# Patient Record
Sex: FEMALE | Race: WHITE | Employment: PART TIME | URBAN - METROPOLITAN AREA
[De-identification: names, ages, dates, MRNs, and addresses within clinical notes are randomized per-mention and may not be internally consistent; named-entity substitution may affect disease eponyms.]

---

## 2021-04-06 LAB
EXTERNAL HIV CONFIRMATION: NORMAL
EXTERNAL HIV SCREEN: NORMAL
HCV AB SER-ACNC: NONREACTIVE

## 2024-07-15 ENCOUNTER — APPOINTMENT (EMERGENCY)
Dept: NON INVASIVE DIAGNOSTICS | Facility: HOSPITAL | Age: 39
End: 2024-07-15
Payer: COMMERCIAL

## 2024-07-15 ENCOUNTER — OFFICE VISIT (OUTPATIENT)
Age: 39
End: 2024-07-15
Payer: COMMERCIAL

## 2024-07-15 ENCOUNTER — HOSPITAL ENCOUNTER (EMERGENCY)
Facility: HOSPITAL | Age: 39
Discharge: HOME | End: 2024-07-15
Attending: EMERGENCY MEDICINE
Payer: COMMERCIAL

## 2024-07-15 VITALS
SYSTOLIC BLOOD PRESSURE: 162 MMHG | OXYGEN SATURATION: 99 % | RESPIRATION RATE: 17 BRPM | DIASTOLIC BLOOD PRESSURE: 82 MMHG | TEMPERATURE: 98.2 F | BODY MASS INDEX: 31.24 KG/M2 | WEIGHT: 206.13 LBS | HEIGHT: 68 IN | HEART RATE: 83 BPM

## 2024-07-15 VITALS
SYSTOLIC BLOOD PRESSURE: 139 MMHG | DIASTOLIC BLOOD PRESSURE: 66 MMHG | HEART RATE: 68 BPM | TEMPERATURE: 98.5 F | RESPIRATION RATE: 18 BRPM | OXYGEN SATURATION: 96 %

## 2024-07-15 DIAGNOSIS — M79.662 PAIN OF LEFT CALF: Primary | ICD-10-CM

## 2024-07-15 DIAGNOSIS — S86.812A STRAIN OF LEFT CALF MUSCLE: Primary | ICD-10-CM

## 2024-07-15 LAB
EXT PREGNANCY TEST URINE: NEGATIVE
EXT. CONTROL: NORMAL

## 2024-07-15 PROCEDURE — 93971 EXTREMITY STUDY: CPT

## 2024-07-15 PROCEDURE — 81025 URINE PREGNANCY TEST: CPT

## 2024-07-15 PROCEDURE — 99284 EMERGENCY DEPT VISIT MOD MDM: CPT

## 2024-07-15 PROCEDURE — 99203 OFFICE O/P NEW LOW 30 MIN: CPT | Performed by: EMERGENCY MEDICINE

## 2024-07-15 PROCEDURE — 96372 THER/PROPH/DIAG INJ SC/IM: CPT

## 2024-07-15 RX ORDER — ACETAMINOPHEN 325 MG/1
975 TABLET ORAL ONCE
Status: COMPLETED | OUTPATIENT
Start: 2024-07-15 | End: 2024-07-15

## 2024-07-15 RX ORDER — KETOROLAC TROMETHAMINE 30 MG/ML
15 INJECTION, SOLUTION INTRAMUSCULAR; INTRAVENOUS ONCE
Status: COMPLETED | OUTPATIENT
Start: 2024-07-15 | End: 2024-07-15

## 2024-07-15 RX ORDER — CYCLOBENZAPRINE HCL 10 MG
10 TABLET ORAL 2 TIMES DAILY PRN
Qty: 20 TABLET | Refills: 0 | Status: SHIPPED | OUTPATIENT
Start: 2024-07-15

## 2024-07-15 RX ORDER — LIDOCAINE 50 MG/G
1 PATCH TOPICAL ONCE
Status: DISCONTINUED | OUTPATIENT
Start: 2024-07-15 | End: 2024-07-15 | Stop reason: HOSPADM

## 2024-07-15 RX ADMIN — KETOROLAC TROMETHAMINE 15 MG: 30 INJECTION, SOLUTION INTRAMUSCULAR; INTRAVENOUS at 19:18

## 2024-07-15 RX ADMIN — LIDOCAINE 1 PATCH: 50 PATCH CUTANEOUS at 19:18

## 2024-07-15 RX ADMIN — ACETAMINOPHEN 975 MG: 325 TABLET, FILM COATED ORAL at 17:01

## 2024-07-15 NOTE — PROGRESS NOTES
"[facility]        NAME: Maggie Bullock is a 38 y.o. female  : 1985    MRN: [id number]  DATE: July 15, 2024  TIME: 3:38 PM    Assessment and Plan   Pain of left calf [M79.662]  1. Pain of left calf  Transfer to other facility    Ambulatory Referral to Physical Therapy            Patient Instructions     Patient Instructions   Patient Education     Deep vein thrombosis - Discharge instructions   The Basics   Written by the doctors and editors at [facility]   What are discharge instructions? -- Discharge instructions are information about how to take care of yourself after getting medical care for a health problem.  What is deep vein thrombosis? -- Deep vein thrombosis (\"DVT\") is the medical term for a blood clot in the deep veins. Most DVTs happen in the legs (figure 1), but a DVT can also happen in the arms. When a DVT happens, blood can back up and cause swelling and pain.  Why are blood clots dangerous? -- If a blood clot forms or gets stuck inside a blood vessel, it can clog the vessel and keep blood from getting where it needs to go.  Blood clots can also travel to other parts of the body and clog blood vessels there. For example, a clot that forms in the leg could end up blocking a blood vessel in the lung. This can make it hard to breathe. Sometimes, if the clot is large, it can even lead to death.  How do I care for myself at home? -- Ask the doctor or nurse what you should do when you go home. Make sure that you understand exactly what you need to do to care for yourself. Ask questions if there is anything you do not understand.  To help with pain and swelling:   If the blood clot is in your leg, prop your legs on a pillow when in bed and on a chair or footstool when you sit. Do not put the pillow under your knee sideways so that your knee is bent. Instead, put it the long way to support your knee and lower leg (figure 2).   If the blood clot is in your arm, prop your arm on a pillow when in bed or " "sitting. Try to keep your arm above the level of your heart.  You should also:   Take all of your medicines exactly as your doctor tells you to. Blood clots are treated with medicines called \"anticoagulants.\" These are sometimes called \"blood thinners.\"   Follow your doctor's instructions about diet and your medicines. Depending on which medicines you take, you might need to pay special attention to what you eat.   Make sure that you know what other medicines are safe to take. Certain other medicines can affect the way that anticoagulants work.   Make sure that you take your medicine exactly as instructed. It's very important to take the right dose. Too much can cause bleeding, and too little can allow your blood to clot.   Try to take your medicine at the same time each day (or at the same times, if you take it twice a day). If you forget or miss a dose, call your doctor or pharmacist to find out what to do.   When you start taking the medicine, you might need to have your blood tested.   If you take warfarin (brand name: Jantoven), you will need regular blood tests to check how your blood is clotting. This is important to make sure that you get the correct dose of warfarin for you.   Get your medicines refilled before you run out.   Avoid things that could increase your risk of injury or bleeding. For example, avoid sports where you could get hurt, and be very careful if you need to use sharp tools.   Lower your risk of getting another blood clot. Your risk is higher if you miss a dose of medicine or stop taking your medicine. Other ways to lower your risk include:   Try to be active - Walk, garden, or do something active for 30 minutes or more on most days of the week.   If you spend a lot of time sitting, try to stand up and walk around at least every 1 to 2 hours. Avoid sitting with your legs crossed. If you can't get up, bend and straighten your knees and ankles and wiggle your toes. Also move your hands, " "wrists, and arms.   When driving or traveling in a car, try to stop every 1 to 2 hours to get out and stretch your legs.   If you travel by plane, move your arms and legs regularly. Walk around and stretch your legs at least once every hour.   Wear elastic or compression stockings. This can improve blood flow in your legs.   If you smoke, it's very important to try to quit. Your doctor or nurse can help you if you are having trouble quitting.  In some cases, people get something called an \"inferior vena cava filter\" (\"IVC filter\") or \"superior vena cava filter\" (\"SVC filter\"). If you had surgery to get an IVC or SVC filter, your doctor or nurse will tell you how to take care of your incision. The filter might need to be removed in the future.  What follow-up care do I need? -- Your doctor or nurse will tell you if you need to make a follow-up appointment. If so, make sure that you know when and where to go. Have your blood tested when your doctor tells you to.  When should I call the doctor? -- Call for emergency help right away (in the [place] and [place], call 9-1-1) if:   You feel short of breath or have trouble breathing.   You have sharp or severe chest pain when you breathe.   You are coughing up blood.   You have signs of stroke, like sudden:   Numbness or weakness of the face, arm, or leg, especially on 1 side of the body   Confusion, or trouble speaking or understanding   Trouble seeing in 1 or both eyes   Trouble walking, dizziness, or loss of balance or coordination   Severe headache with no known cause  Call the doctor or nurse for advice if:   You notice new or worsened swelling in your arm or leg.   Your arm or leg becomes numb or very painful to touch.   Your leg hurts when you walk, or your arm hurts when you move it.   Your arm or leg turns blue or gray.   You have discomfort when you take a deep breath.   You have any of these signs of abnormal bleeding:   Nausea   Blood in your bowel movements or " dark-colored bowel movements   Headaches or dizziness   Nosebleeds or any other bleeding that does not stop   Dark red or brown urine  You should also tell your doctor if you:   Have an injury such as a fall where you hit your head   Bleed from your gums after brushing your teeth   Have heavy menstrual periods or bleeding between periods   Have more bruising than usual after a minor injury   Have diarrhea, vomit, or are unable to eat for more than 24 hours   Have a fever (temperature higher than 100.4°F or 38°C)   Cannot take your medicine for any reason  All topics are updated as new evidence becomes available and our peer review process is complete.  This topic retrieved from [employer] on: Feb 26, 2024.  Topic 296809 Version 2.0  Release: 32.2.4 - C32.56  © 2024 UpToDate, Inc. and/or its affiliates. All rights reserved.  figure 1: Deep veins of the leg     In people with deep vein thrombosis, blood clots can form in the deep veins of the legs (shown in blue).  Graphic 21040 Version 2.0  figure 2: Using a pillow to prop up your legs     Use a pillow to prop up your lower legs in bed. Put it the long way to support your knees and lower legs.  Graphic 420407 Version 1.0  Consumer Information Use and Disclaimer   Disclaimer: This generalized information is a limited summary of diagnosis, treatment, and/or medication information. It is not meant to be comprehensive and should be used as a tool to help the user understand and/or assess potential diagnostic and treatment options. It does NOT include all information about conditions, treatments, medications, side effects, or risks that may apply to a specific patient. It is not intended to be medical advice or a substitute for the medical advice, diagnosis, or treatment of a health care provider based on the health care provider's examination and assessment of a patient's specific and unique circumstances. Patients must speak with a health care provider for complete  information about their health, medical questions, and treatment options, including any risks or benefits regarding use of medications. This information does not endorse any treatments or medications as safe, effective, or approved for treating a specific patient. UpToDate, Inc. and its affiliates disclaim any warranty or liability relating to this information or the use thereof.The use of this information is governed by the Terms of Use, available at https://www.official.fmer.com/en/know/clinical-effectiveness-terms. 2024© UpToDate, Inc. and its affiliates and/or licensors. All rights reserved.  Copyright   © 2024 UpToDate, Inc. and/or its affiliates. All rights reserved.      Follow up with PCP in 3-5 days.  Proceed to  ER if symptoms worsen.    Chief Complaint     Chief Complaint   Patient presents with    Leg Pain     X2 weeks - persistent pain in LEFT lower leg below knee at the top of calf muscle. Denies specific injury. States that she has been working out a lot more. OTC - IBU Concerned that back of knee is swollen compared to other leg. Denies redness in back of calf. States that one leg feels warmer than the other.          History of Present Illness       Patient complains of left calf pain for the past 2 weeks.  She noticed that today her left calf appeared to be swollen.  She denies chest pain or shortness of breath.  She denies history of PE or DVT.  Is an ex-smoker, not on estrogens and no family history of DVT or PE.  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            Review of Systems   Review of Systems   Constitutional:  Negative for activity change.   Cardiovascular:  Positive for leg swelling. Negative for chest pain and palpitations.   Gastrointestinal:  Negative for abdominal pain.   Musculoskeletal:  Negative for arthralgias, back pain, joint swelling, myalgias, neck pain and neck stiffness.   Skin:  Negative for color change and wound.   Neurological:  Negative for dizziness,  "syncope, weakness, light-headedness and headaches.         Current Medications     No current outpatient medications on file.    Current Allergies     Allergies as of 07/15/2024 - Reviewed 07/15/2024   Allergen Reaction Noted    Penicillins Hives 04/27/2021            The following portions of the patient's history were reviewed and updated as appropriate: allergies, current medications, past family history, past medical history, past social history, past surgical history and problem list.     History reviewed. No pertinent past medical history.    Past Surgical History:   Procedure Laterality Date    CERVICAL BIOPSY  W/ LOOP ELECTRODE EXCISION      MOUTH SURGERY         History reviewed. No pertinent family history.      Medications have been verified.        Objective   /82 (BP Location: Right arm, Patient Position: Sitting, Cuff Size: Standard)   Pulse 83   Temp 98.2 °F (36.8 °C) (Tympanic)   Resp 17   Ht 5' 7.5\" (1.715 m)   Wt 93.5 kg (206 lb 2.1 oz)   LMP 06/26/2024 (Approximate)   SpO2 99%   BMI 31.81 kg/m²        Physical Exam     Physical Exam  Vitals and nursing note reviewed.   Constitutional:       General: She is not in acute distress.     Appearance: She is well-developed. She is not ill-appearing.   HENT:      Head: Normocephalic and atraumatic.   Eyes:      Conjunctiva/sclera: Conjunctivae normal.      Pupils: Pupils are equal, round, and reactive to light.   Musculoskeletal:         General: Swelling and tenderness present.      Cervical back: Normal range of motion and neck supple.      Comments: Mild swelling, mild tenderness left calf, Homans negative.   Skin:     General: Skin is warm and dry.      Findings: No rash.   Neurological:      Mental Status: She is alert and oriented to person, place, and time.      Deep Tendon Reflexes: Reflexes normal.   Psychiatric:         Mood and Affect: Mood normal.         Behavior: Behavior normal.         Thought Content: Thought content normal. "         Judgment: Judgment normal.

## 2024-07-15 NOTE — PATIENT INSTRUCTIONS
"Patient Education     Deep vein thrombosis - Discharge instructions   The Basics   Written by the doctors and editors at [facility]   What are discharge instructions? -- Discharge instructions are information about how to take care of yourself after getting medical care for a health problem.  What is deep vein thrombosis? -- Deep vein thrombosis (\"DVT\") is the medical term for a blood clot in the deep veins. Most DVTs happen in the legs (figure 1), but a DVT can also happen in the arms. When a DVT happens, blood can back up and cause swelling and pain.  Why are blood clots dangerous? -- If a blood clot forms or gets stuck inside a blood vessel, it can clog the vessel and keep blood from getting where it needs to go.  Blood clots can also travel to other parts of the body and clog blood vessels there. For example, a clot that forms in the leg could end up blocking a blood vessel in the lung. This can make it hard to breathe. Sometimes, if the clot is large, it can even lead to death.  How do I care for myself at home? -- Ask the doctor or nurse what you should do when you go home. Make sure that you understand exactly what you need to do to care for yourself. Ask questions if there is anything you do not understand.  To help with pain and swelling:   If the blood clot is in your leg, prop your legs on a pillow when in bed and on a chair or footstool when you sit. Do not put the pillow under your knee sideways so that your knee is bent. Instead, put it the long way to support your knee and lower leg (figure 2).   If the blood clot is in your arm, prop your arm on a pillow when in bed or sitting. Try to keep your arm above the level of your heart.  You should also:   Take all of your medicines exactly as your doctor tells you to. Blood clots are treated with medicines called \"anticoagulants.\" These are sometimes called \"blood thinners.\"   Follow your doctor's instructions about diet and your medicines. Depending on which " medicines you take, you might need to pay special attention to what you eat.   Make sure that you know what other medicines are safe to take. Certain other medicines can affect the way that anticoagulants work.   Make sure that you take your medicine exactly as instructed. It's very important to take the right dose. Too much can cause bleeding, and too little can allow your blood to clot.   Try to take your medicine at the same time each day (or at the same times, if you take it twice a day). If you forget or miss a dose, call your doctor or pharmacist to find out what to do.   When you start taking the medicine, you might need to have your blood tested.   If you take warfarin (brand name: Jantoven), you will need regular blood tests to check how your blood is clotting. This is important to make sure that you get the correct dose of warfarin for you.   Get your medicines refilled before you run out.   Avoid things that could increase your risk of injury or bleeding. For example, avoid sports where you could get hurt, and be very careful if you need to use sharp tools.   Lower your risk of getting another blood clot. Your risk is higher if you miss a dose of medicine or stop taking your medicine. Other ways to lower your risk include:   Try to be active - Walk, garden, or do something active for 30 minutes or more on most days of the week.   If you spend a lot of time sitting, try to stand up and walk around at least every 1 to 2 hours. Avoid sitting with your legs crossed. If you can't get up, bend and straighten your knees and ankles and wiggle your toes. Also move your hands, wrists, and arms.   When driving or traveling in a car, try to stop every 1 to 2 hours to get out and stretch your legs.   If you travel by plane, move your arms and legs regularly. Walk around and stretch your legs at least once every hour.   Wear elastic or compression stockings. This can improve blood flow in your legs.   If you smoke,  "it's very important to try to quit. Your doctor or nurse can help you if you are having trouble quitting.  In some cases, people get something called an \"inferior vena cava filter\" (\"IVC filter\") or \"superior vena cava filter\" (\"SVC filter\"). If you had surgery to get an IVC or SVC filter, your doctor or nurse will tell you how to take care of your incision. The filter might need to be removed in the future.  What follow-up care do I need? -- Your doctor or nurse will tell you if you need to make a follow-up appointment. If so, make sure that you know when and where to go. Have your blood tested when your doctor tells you to.  When should I call the doctor? -- Call for emergency help right away (in the [place] and [place], call 9-1-1) if:   You feel short of breath or have trouble breathing.   You have sharp or severe chest pain when you breathe.   You are coughing up blood.   You have signs of stroke, like sudden:   Numbness or weakness of the face, arm, or leg, especially on 1 side of the body   Confusion, or trouble speaking or understanding   Trouble seeing in 1 or both eyes   Trouble walking, dizziness, or loss of balance or coordination   Severe headache with no known cause  Call the doctor or nurse for advice if:   You notice new or worsened swelling in your arm or leg.   Your arm or leg becomes numb or very painful to touch.   Your leg hurts when you walk, or your arm hurts when you move it.   Your arm or leg turns blue or gray.   You have discomfort when you take a deep breath.   You have any of these signs of abnormal bleeding:   Nausea   Blood in your bowel movements or dark-colored bowel movements   Headaches or dizziness   Nosebleeds or any other bleeding that does not stop   Dark red or brown urine  You should also tell your doctor if you:   Have an injury such as a fall where you hit your head   Bleed from your gums after brushing your teeth   Have heavy menstrual periods or bleeding between periods   Have " more bruising than usual after a minor injury   Have diarrhea, vomit, or are unable to eat for more than 24 hours   Have a fever (temperature higher than 100.4°F or 38°C)   Cannot take your medicine for any reason  All topics are updated as new evidence becomes available and our peer review process is complete.  This topic retrieved from [employer] on: Feb 26, 2024.  Topic 625263 Version 2.0  Release: 32.2.4 - C32.56  © 2024 UpToDate, Inc. and/or its affiliates. All rights reserved.  figure 1: Deep veins of the leg     In people with deep vein thrombosis, blood clots can form in the deep veins of the legs (shown in blue).  Graphic 32453 Version 2.0  figure 2: Using a pillow to prop up your legs     Use a pillow to prop up your lower legs in bed. Put it the long way to support your knees and lower legs.  Graphic 900319 Version 1.0  Consumer Information Use and Disclaimer   Disclaimer: This generalized information is a limited summary of diagnosis, treatment, and/or medication information. It is not meant to be comprehensive and should be used as a tool to help the user understand and/or assess potential diagnostic and treatment options. It does NOT include all information about conditions, treatments, medications, side effects, or risks that may apply to a specific patient. It is not intended to be medical advice or a substitute for the medical advice, diagnosis, or treatment of a health care provider based on the health care provider's examination and assessment of a patient's specific and unique circumstances. Patients must speak with a health care provider for complete information about their health, medical questions, and treatment options, including any risks or benefits regarding use of medications. This information does not endorse any treatments or medications as safe, effective, or approved for treating a specific patient. UpToDate, Inc. and its affiliates disclaim any warranty or liability relating to this  information or the use thereof.The use of this information is governed by the Terms of Use, available at https://www.wolterskluwer.com/en/know/clinical-effectiveness-terms. 2024© UpToDate, Inc. and its affiliates and/or licensors. All rights reserved.  Copyright   © 2024 [employer] and/or its affiliates. All rights reserved.

## 2024-07-15 NOTE — ED PROVIDER NOTES
History  Chief Complaint   Patient presents with    Leg Pain     Pt states that she has been experiencing a stabbing pain in her L calf since the beginning of July. Was sent from PCP for concerns of DVT. Also c/o increased swelling and pain when walking.      This is a pleasant 38-year-old female who presents to the ED for evaluation of left leg pain X approximately 2 weeks.  Patient denies any falls or injury.  She does report left calf pain that radiates to the back of her left knee.  She describes the pain as an aching sensation, states the pain is constant, slightly improved with exercise.  Does report that she goes to the gym multiple times per week.  She was seen at urgent care earlier today, sent to the ED for further evaluation and management due to concern for possible DVT.  She does state that she has not noticed any discoloration or swelling of her left leg, denies any weakness or loss of sensation of her left leg, states has been able to walk without significant difficulty.  She denies any other acute concerns or complaints at this time including recent fevers, chills, headaches, lightheadedness, hemoptysis, chest pain, SOB, abdominal pain, NVD, dysuria.  She denies any recent prolonged travel, denies any known history of blood clots, states she is not on any estrogen-based birth control.  She reports she did take Advil at approximately 1 PM.        None       History reviewed. No pertinent past medical history.    Past Surgical History:   Procedure Laterality Date    CERVICAL BIOPSY  W/ LOOP ELECTRODE EXCISION      MOUTH SURGERY         History reviewed. No pertinent family history.  I have reviewed and agree with the history as documented.    E-Cigarette/Vaping    E-Cigarette Use Former User      E-Cigarette/Vaping Substances    Nicotine No     THC No     CBD No     Flavoring No      Social History     Tobacco Use    Smoking status: Former     Types: Cigarettes     Passive exposure: Never    Smokeless  tobacco: Never   Vaping Use    Vaping status: Former   Substance Use Topics    Alcohol use: Yes     Comment: socially    Drug use: Yes     Types: Marijuana       Review of Systems   Constitutional:  Negative for chills and fever.   Eyes:  Negative for photophobia and visual disturbance.   Respiratory:  Negative for cough and shortness of breath.    Cardiovascular:  Negative for chest pain and palpitations.   Gastrointestinal:  Negative for abdominal pain, diarrhea, nausea and vomiting.   Genitourinary:  Negative for difficulty urinating, dysuria and hematuria.   Musculoskeletal:  Positive for myalgias (pain of left calf). Negative for neck pain and neck stiffness.   Neurological:  Negative for dizziness, light-headedness and headaches.       Physical Exam  Physical Exam  Vitals and nursing note reviewed.   Constitutional:       General: She is not in acute distress.     Appearance: Normal appearance. She is well-developed. She is not ill-appearing, toxic-appearing or diaphoretic.   HENT:      Head: Normocephalic and atraumatic.   Eyes:      Conjunctiva/sclera: Conjunctivae normal.   Cardiovascular:      Rate and Rhythm: Normal rate and regular rhythm.      Heart sounds: No murmur heard.  Pulmonary:      Effort: Pulmonary effort is normal. No respiratory distress.      Breath sounds: Normal breath sounds.   Abdominal:      Palpations: Abdomen is soft.      Tenderness: There is no abdominal tenderness.   Musculoskeletal:         General: No swelling.      Cervical back: Normal range of motion and neck supple. No rigidity.      Left hip: No deformity or tenderness. Normal range of motion.      Left upper leg: No swelling, deformity or tenderness.      Left knee: No swelling or deformity. Normal range of motion. No tenderness.      Left lower leg: No swelling or tenderness. No edema.      Left ankle: No swelling or deformity. No tenderness. Normal range of motion.      Left Achilles Tendon: No tenderness or defects.       Left foot: Normal range of motion and normal capillary refill. No swelling or deformity.      Comments: Examination of patient's left leg shows no significant swelling in the left leg when compared to right.  No signs of erythema or ecchymosis.  Left lower leg is warm but not hot to touch.  Patient is normal range of motion left hip, knee, ankle.  Able to plantar and dorsiflex without difficulty.  Calf is nontender.  Proximal and distal sensation intact, normal cap refill in all digits of left lower extremity, dorsal pedal pulse and posterior tibial pulses 2+.   Skin:     General: Skin is warm and dry.      Capillary Refill: Capillary refill takes less than 2 seconds.   Neurological:      General: No focal deficit present.      Mental Status: She is alert and oriented to person, place, and time.   Psychiatric:         Mood and Affect: Mood normal.         Vital Signs  ED Triage Vitals   Temperature Pulse Respirations Blood Pressure SpO2   07/15/24 1635 07/15/24 1632 07/15/24 1632 07/15/24 1632 07/15/24 1632   98.5 °F (36.9 °C) 104 16 (!) 197/101 98 %      Temp Source Heart Rate Source Patient Position - Orthostatic VS BP Location FiO2 (%)   07/15/24 1635 07/15/24 1632 07/15/24 1632 07/15/24 1632 --   Oral Monitor Sitting Right arm       Pain Score       07/15/24 1632       5           Vitals:    07/15/24 1730 07/15/24 1800 07/15/24 1830 07/15/24 1900   BP: 149/77 139/64 154/74 139/66   Pulse: 79 70 66 68   Patient Position - Orthostatic VS: Sitting Sitting Sitting Sitting         Visual Acuity      ED Medications  Medications   lidocaine (LIDODERM) 5 % patch 1 patch (1 patch Topical Medication Applied 7/15/24 1918)   acetaminophen (TYLENOL) tablet 975 mg (975 mg Oral Given 7/15/24 1701)   ketorolac (TORADOL) injection 15 mg (15 mg Intramuscular Given 7/15/24 1918)       Diagnostic Studies  Results Reviewed       Procedure Component Value Units Date/Time    POCT pregnancy, urine [[id number]]  (Normal) Resulted:  07/15/24 1754    Lab Status: Final result Updated: 07/15/24 1754     EXT Preg Test, Ur Negative     Control Valid                   VAS VENOUS DUPLEX -LOWER LIMB UNILATERAL    (Results Pending)              Procedures  Procedures         ED Course  ED Course as of 07/15/24 1921   Mon Jul 15, 2024   1909 VAS VENOUS DUPLEX -LOWER LIMB UNILATERAL  Per vascular tech, patient negative for DVT.                                 SBIRT 20yo+      Flowsheet Row Most Recent Value   Initial Alcohol Screen: US AUDIT-C     1. How often do you have a drink containing alcohol? 0 Filed at: 07/15/2024 1708   2. How many drinks containing alcohol do you have on a typical day you are drinking?  0 Filed at: 07/15/2024 1708   3a. Male UNDER 65: How often do you have five or more drinks on one occasion? 0 Filed at: 07/15/2024 1708   3b. FEMALE Any Age, or MALE 65+: How often do you have 4 or more drinks on one occassion? 0 Filed at: 07/15/2024 1708   Audit-C Score 0 Filed at: 07/15/2024 1708   RAFAELA: How many times in the past year have you...    Used an illegal drug or used a prescription medication for non-medical reasons? Never Filed at: 07/15/2024 1708                      Medical Decision Making  3-year-old female presents to the ED for evaluation of nontraumatic left calf pain X approximately 2 weeks.  Denies any other acute concerns or complaints at this time, ambulatory in ED.  Afebrile normal vital signs in ED, well-appearing on exam.  Venous duplex ultrasound showed no signs of acute DVT.  Symptoms likely musculoskeletal in nature, patient was already given referral to physical therapy by urgent care.  Given as needed Flexeril, advised not to drive or operate heavy machinery after use as it may cause drowsiness.  Advised importance of follow-up with physical therapy, also advised PCP follow-up for further evaluation and management.  ED return precautions discussed with patient.  Patient verbalized understanding and agreement with  plan.    Amount and/or Complexity of Data Reviewed  Labs: ordered.    Risk  OTC drugs.                 Disposition  Final diagnoses:   Strain of left calf muscle     Time reflects when diagnosis was documented in both MDM as applicable and the Disposition within this note       Time User Action Codes Description Comment    7/15/2024  7:14 PM Alex Rosenthal Add [S86.812A] Strain of left calf muscle           ED Disposition       ED Disposition   Discharge    Condition   Stable    Date/Time   Mon Jul 15, 2024 1914    Comment   Maggie Bullock discharge to home/self care.                   Follow-up Information    None         Patient's Medications   Discharge Prescriptions    CYCLOBENZAPRINE (FLEXERIL) 10 MG TABLET    Take 1 tablet (10 mg total) by mouth 2 (two) times a day as needed for muscle spasms       Start Date: 7/15/2024 End Date: --       Order Dose: 10 mg       Quantity: 20 tablet    Refills: 0       No discharge procedures on file.    PDMP Review       None            ED Provider  Electronically Signed by             Alex Rosenthal PA-C  07/15/24 1921

## 2024-07-15 NOTE — DISCHARGE INSTRUCTIONS
Take 650mg of acetaminophen (Tylenol) with 400 mg of ibuprofen (Advil) every 6-8 hours as needed for pain.  Lidocaine patches are available over-the-counter can be found in the back pain aisle of most pharmacies.  Look for 4% lidocaine in the list of the active ingredients.  These patches can be placed for 12 hours.  After 12 hours, discard the patch.  The next patch can be placed 12 hours later.  May take Flexeril as needed.  Be advised it is a muscle relaxer may cause drowsiness, do not drive or operate heavy machinery.  Follow-up with physical therapy for further evaluation and management as well as your primary care provider.  Return to the ED if you develop any new or worsening symptoms.

## 2024-07-16 PROCEDURE — 93971 EXTREMITY STUDY: CPT | Performed by: INTERNAL MEDICINE

## 2024-07-23 ENCOUNTER — EVALUATION (OUTPATIENT)
Age: 39
End: 2024-07-23
Payer: COMMERCIAL

## 2024-07-23 DIAGNOSIS — M79.662 PAIN OF LEFT CALF: Primary | ICD-10-CM

## 2024-07-23 PROCEDURE — 97161 PT EVAL LOW COMPLEX 20 MIN: CPT | Performed by: PHYSICAL THERAPIST

## 2024-07-23 PROCEDURE — 97110 THERAPEUTIC EXERCISES: CPT | Performed by: PHYSICAL THERAPIST

## 2024-07-23 NOTE — PROGRESS NOTES
PT Evaluation     Today's date: 2024  Patient name: Maggie Bullock  : 1985  MRN: [id number]  Referring provider: Norbert Murray MD  Dx:   Encounter Diagnosis     ICD-10-CM    1. Pain of left calf  M79.662 Ambulatory Referral to Physical Therapy                     Assessment  Impairments: abnormal coordination, abnormal muscle firing, abnormal or restricted ROM, abnormal movement, activity intolerance, impaired balance, impaired physical strength, lacks appropriate home exercise program, pain with function, poor posture  and poor body mechanics  Functional limitations: walking, running jumping exercise participation  Symptom irritability: moderate    Assessment details: Maggie Bullock is a 38 y.o. female presenting with acute L proximal claf pain consitent with gastrco strain. Primary impairments include decreased ROM ,weakness, hip weakness, motor control. Will benefit from skilled PT interventions for return to PLO. HEP was provided and reviewed. Patient is able to complete HEP with good technique and appropriate pain response. Patient expressed understanding of appropriate dosage and frequency of HEP. No additional referral necessary.   Understanding of Dx/Px/POC: good     Prognosis: good    Goals    Short Term Goals:  In 4 weeks, the patient will:  1. SL HR To >10 on L  2. Hipabd to 4/5  3. Supervision with HEP for self care    Long Term Goals:  In 8 weeks, the patient will:  1. SL HR to 20  2. FOTO to greater than predicted value  3. Independent with HEP for selfcare    Plan  Patient would benefit from: skilled physical therapy    Planned therapy interventions: joint mobilization, manual therapy, massage, Bartlett taping, neuromuscular re-education, patient education, postural training, self care, strengthening, stretching, therapeutic activities, therapeutic exercise, therapeutic training, graded exercise, graded activity, home exercise program, flexibility, functional ROM exercises, balance and  activity modification    Frequency: 2x week  Duration in weeks: 8  Treatment plan discussed with: patient      Subjective  Pain Location: L calf/popliteal pain (negative DVT Testing)  Pain Intensity: 3/10, 7/10 at worst  [place]: change/increased exercise regime  DOI: July L calf/popliteal pain  Provoked by: pushing off, stairs, post activity, plyometrics  Eased Positions: muscle relaxors, stretching,   Constitutional S/S: denies   Goals: no pain return to PLOF with exercise participation, pilgramage across [place] in 2026    Objective        Red Flags: none    Standing Posture & Lower Extremity Alignment:  Structure/Joint         Rearfoot x Valgus  Neutral  Varus   Forefoot x Abducted  Neutral     Arch x Pes Planus  Neutral  Pes Cavus     Range of Motion: Goniometric revealed the following findings (in degrees).  Joint Motion Right: 7/23/2024 Left: 7/23/2024   Hip Flexion WNL WNL   Hip Extension WNL WNL   Hip External Rotation WNL WNL   Hip Internal Rotation WNL WNL   Knee Extension WNl WNL   Knee Flexion WNL WNL   Ankle Dorsiflexion WNl WNL   Ankle Plantarflexion WNL WNL     Strength: MMT revealed the following findings.  Joint Motion Right: 7/23/2024 Left: 7/23/2024   Hip Flexion 5/5 4/5   Hip Abduction 5/5 3+/5   Hip Adduction 5/5 4/5   Hip Extension 5/5 3+/5   Knee Extension 5/5 5/5   Knee Flexion 5/5 5/5   Ankle Plantarflexion 5/5 3+/5   Ankle Dorsiflexion 5/5 4/5   SL HR test 20 5     Additional Assessments:  Palpation: TTP proximal gastroc  Observation: pleasant healthy female  Gait Pattern: mild antalgia  Joint Mobility: Generalized hypermobility    Special Tests:All neg         Precautions: universal    POC expires Unit limit Auth Expiration date PT/OT + Visit Limit?   9/23 24         Visit/Unit Tracking  AUTH Status:  Date 7/23      Req post 24v Used 1       Remaining           Pertinent Findings:      POC End Date: 9/23/24                                                                                           Test / Measure  7/23   FOTO (Predicted 52) 72   SL HR test R/L 20/5   L Hip abd 3+/5           Manuals 7/23                                                                Neuro Re-Ed             SL Eccentric HR             Hip abdSLR             Hip Add SLR             Hip Flexion SLR             Lat band walk                                       Ther Ex             NS w/ HR             HEP reve 8'            HOLD GASTROC STRETCHING                                                                                           Ther Activity                                       Gait Training                                       Modalities

## 2024-07-26 ENCOUNTER — OFFICE VISIT (OUTPATIENT)
Age: 39
End: 2024-07-26
Payer: COMMERCIAL

## 2024-07-26 DIAGNOSIS — M79.662 PAIN OF LEFT CALF: Primary | ICD-10-CM

## 2024-07-26 PROCEDURE — 97112 NEUROMUSCULAR REEDUCATION: CPT | Performed by: PHYSICAL THERAPIST

## 2024-07-26 PROCEDURE — 97110 THERAPEUTIC EXERCISES: CPT | Performed by: PHYSICAL THERAPIST

## 2024-07-26 NOTE — PROGRESS NOTES
"Daily Note     Today's date: 2024  Patient name: Maggie Bullock  : 1985  MRN: [id number]  Referring provider: Norbert Murray MD  Dx:   Encounter Diagnosis     ICD-10-CM    1. Pain of left calf  M79.662           Start Time: 1015  Stop Time: 1100  Total time in clinic (min): 45 minutes    Subjective: Pt reports minima change in status.     Objective: See treatment diary below    Assessment: Tolerated treatment well. Patient demonstrated fatigue post treatment, exhibited good technique with therapeutic exercises, and would benefit from continued PT 1:1 with Андрей Zarate DPT for entirety of tx. Tolerated tx well with reduced pain post Cuppping. Continue per POC.    Plan: Continue per plan of care.      Precautions: universal    POC expires Unit limit Auth Expiration date PT/OT + Visit Limit?   24         Visit/Unit Tracking  AUTH Status:  Date      Req post 24v Used 1 2      Remaining           Pertinent Findings:      POC End Date: 24                                                                                          Test / Measure     FOTO (Predicted 52) 72   SL HR test R/L 20/5   L Hip abd 3+/5           Manuals    Cupping w/ movt  KS   Piriformiis S  3x30\"             Neuro Re-Ed     SL Eccentric HR     HR on block  2x20   Hip abdSLR  2x15   Hip Add SLR  2x15   Hip Flexion SLR     Lat band walk  hold   SL Foam  2x30\"        Ther Ex     NS w/ HR  6'   HEP reve 8'    prostretch  4x30\"                                 Ther Activity               Gait Training               Modalities                    "

## 2024-07-31 ENCOUNTER — OFFICE VISIT (OUTPATIENT)
Age: 39
End: 2024-07-31
Payer: COMMERCIAL

## 2024-07-31 DIAGNOSIS — M79.662 PAIN OF LEFT CALF: Primary | ICD-10-CM

## 2024-07-31 PROCEDURE — 97140 MANUAL THERAPY 1/> REGIONS: CPT | Performed by: PHYSICAL THERAPIST

## 2024-07-31 PROCEDURE — 97112 NEUROMUSCULAR REEDUCATION: CPT | Performed by: PHYSICAL THERAPIST

## 2024-07-31 PROCEDURE — 97110 THERAPEUTIC EXERCISES: CPT | Performed by: PHYSICAL THERAPIST

## 2024-07-31 NOTE — PROGRESS NOTES
"Daily Note     Today's date: 2024  Patient name: Maggie Bullock  : 1985  MRN: [id number]  Referring provider: Norbert Murray MD  Dx:   Encounter Diagnosis     ICD-10-CM    1. Pain of left calf  M79.662           Start Time: 1030  Stop Time: 1115  Total time in clinic (min): 45 minutes    Subjective: Had mild increased pain/soreness with prolonged standing at work over weekend.     Objective: See treatment diary below    Assessment: Tolerated treatment well. Patient demonstrated fatigue post treatment, exhibited good technique with therapeutic exercises, and would benefit from continued PT 1:1 with Андрей Zarate DPT for 30mins of tx 1;1 with Beck Suarez for 15 mins of tx.     Plan: Continue per plan of care.      Precautions: universal    POC expires Unit limit Auth Expiration date PT/OT + Visit Limit?   24         Visit/Unit Tracking  AUTH Status:  Date     Req post 24v Used 1 2 3     Remaining           Pertinent Findings:      POC End Date: 24                                                                                          Test / Measure     FOTO (Predicted 52) 72   SL HR test R/L 20/5   L Hip abd 3+/5           Manuals    Cupping w/ movt  KS SS   Piriformiis S  3x30\" 3x30\"               Neuro Re-Ed      SL Eccentric HR   2x10 10#   HR on block  2x20 2x20   Hip abdSLR  2x15 2x15 2#   Hip Add SLR  2x15 2x15 2#   Hip Flexion SLR      Lat band walk  hold 2x20 btb   SL Foam  2x30\" 2x30\"         Ther Ex      NS w/ HR  6' 6'   HEP reve 8'     prostretch  4x30\" 4x30\"                                       Ther Activity                  Gait Training                  Modalities                         "

## 2024-08-02 ENCOUNTER — OFFICE VISIT (OUTPATIENT)
Age: 39
End: 2024-08-02
Payer: COMMERCIAL

## 2024-08-02 DIAGNOSIS — M79.662 PAIN OF LEFT CALF: Primary | ICD-10-CM

## 2024-08-02 PROCEDURE — 97110 THERAPEUTIC EXERCISES: CPT | Performed by: PHYSICAL THERAPIST

## 2024-08-02 PROCEDURE — 97112 NEUROMUSCULAR REEDUCATION: CPT | Performed by: PHYSICAL THERAPIST

## 2024-08-02 NOTE — PROGRESS NOTES
"Daily Note     Today's date: 2024  Patient name: Maggie Bullock  : 1985  MRN: [id number]  Referring provider: Norbert Murray MD  Dx:   Encounter Diagnosis     ICD-10-CM    1. Pain of left calf  M79.662           Start Time: 0800  Stop Time: 0838  Total time in clinic (min): 38 minutes    Subjective: Increased pain post last tx, attributes to more aggressive cupping last visit.     Objective: See treatment diary below    Assessment: Tolerated treatment well. Patient demonstrated fatigue post treatment, exhibited good technique with therapeutic exercises, and would benefit from continued PT 1:1 with Андрей Zarate DPT for entirety of tx. Reduced tx intensity,     Plan: Continue per plan of care.      Precautions: universal    POC expires Unit limit Auth Expiration date PT/OT + Visit Limit?   24         Visit/Unit Tracking  AUTH Status:  Date  8/2   Req post 24v Used 1 2 3 4    Remaining           Pertinent Findings:      POC End Date: 24                                                                                          Test / Measure     FOTO (Predicted 52) 72   SL HR test R/L 20/5   L Hip abd 3+/5           Manuals  8/2   Cupping w/ movt  KS SS held   Piriformiis S  3x30\" 3x30\"                  Neuro Re-Ed       SL Eccentric HR   2x10 10# held   HR on block  2x20 2x20 2x20   Hip abdSLR  2x15 2x15 2# 2x15   Hip Add SLR  2x15 2x15 2# 2x15   Hip Flexion SLR       Lat band walk  hold 2x20 btb 2x20 black   SL Foam  2x30\" 2x30\"           Ther Ex       NS w/ HR  6' 6' 6'   HEP reve 8'      prostretch  4x30\" 4x30\" 4x30\"                                             Ther Activity                     Gait Training                     Modalities                              "

## 2024-08-07 ENCOUNTER — OFFICE VISIT (OUTPATIENT)
Age: 39
End: 2024-08-07
Payer: COMMERCIAL

## 2024-08-07 DIAGNOSIS — M79.662 PAIN OF LEFT CALF: Primary | ICD-10-CM

## 2024-08-07 PROCEDURE — 97110 THERAPEUTIC EXERCISES: CPT | Performed by: PHYSICAL THERAPIST

## 2024-08-07 PROCEDURE — 97112 NEUROMUSCULAR REEDUCATION: CPT | Performed by: PHYSICAL THERAPIST

## 2024-08-07 NOTE — PROGRESS NOTES
"Daily Note     Today's date: 2024  Patient name: Maggie Bullock  : 1985  MRN: [id number]  Referring provider: Norbert Murray MD  Dx:   Encounter Diagnosis     ICD-10-CM    1. Pain of left calf  M79.662                      Subjective: patient reports she feels weak today. She took it easy over the weekend and got her first workout of the week in prior to therapy today.     Objective: See treatment diary below      Assessment: Tolerated treatment well. Continued with program as outlined below. No reports of increased pain throughout session. Does note fatigue with sidelying hip abd/add. Patient exhibited good technique with therapeutic exercises and would benefit from continued PT      Plan: Continue per plan of care.      Precautions: universal    POC expires Unit limit Auth Expiration date PT/OT + Visit Limit?   24         Visit/Unit Tracking  AUTH Status:  Date    Req post 24v Used 1 2 3 4 5    Remaining            Pertinent Findings:      POC End Date: 24                                                                                          Test / Measure     FOTO (Predicted 52) 72   SL HR test R/L 20/5   L Hip abd 3+/5           Manuals    Cupping w/ movt  KS SS held    Piriformiis S  3x30\" 3x30\"  3x30\"                   Neuro Re-Ed        SL Eccentric HR   2x10 10# held    HR on block  2x20 2x20 2x20 2x20   Hip abdSLR  2x15 2x15 2# 2x15 2x15 2#   Hip Add SLR  2x15 2x15 2# 2x15 2x15 2#   Hip Flexion SLR        Lat band walk  hold 2x20 btb 2x20 black 2x20 black   SL Foam  2x30\" 2x30\"  2x30\"           Ther Ex        NS w/ HR  6' 6' 6' 6'   HEP reve 8'       prostretch  4x30\" 4x30\" 4x30\" 4x30\"                                                   Ther Activity                        Gait Training                        Modalities                                   "

## 2024-08-09 ENCOUNTER — OFFICE VISIT (OUTPATIENT)
Age: 39
End: 2024-08-09
Payer: COMMERCIAL

## 2024-08-09 DIAGNOSIS — M79.662 PAIN OF LEFT CALF: Primary | ICD-10-CM

## 2024-08-09 PROCEDURE — 97112 NEUROMUSCULAR REEDUCATION: CPT | Performed by: PHYSICAL THERAPIST

## 2024-08-09 PROCEDURE — 97110 THERAPEUTIC EXERCISES: CPT | Performed by: PHYSICAL THERAPIST

## 2024-08-09 NOTE — PROGRESS NOTES
"Daily Note     Today's date: 2024  Patient name: Maggie Bullock  : 1985  MRN: [id number]  Referring provider: Norbert Murray MD  Dx:   Encounter Diagnosis     ICD-10-CM    1. Pain of left calf  M79.662           Start Time: 1015  Stop Time: 1053  Total time in clinic (min): 38 minutes    Subjective: patient reports she had one moment of a twinge in he calf after work one day but other than that it has been feeling pretty good.      Objective: See treatment diary below      Assessment: Tolerated treatment well. Continued with program as noted below. No increased pain noted throughout session. Patient exhibited good technique with therapeutic exercises and would benefit from continued PT      Plan: Continue per plan of care.      Precautions: universal    POC expires Unit limit Auth Expiration date PT/OT + Visit Limit?   24         Visit/Unit Tracking  AUTH Status:  Date    Req post 24v Used 1 2 3 4 5 6    Remaining             Pertinent Findings:      POC End Date: 24                                                                                          Test / Measure     FOTO (Predicted 52) 72   SL HR test R/L 20/5   L Hip abd 3+/5           Manuals  8   Cupping w/ movt  KS SS held     Piriformiis S  3x30\" 3x30\"  3x30\" 3x30\"                     Neuro Re-Ed         SL Eccentric HR   2x10 10# held     HR on block  2x20 2x20 2x20 2x20 2x20   Hip abd SLR  2x15 2x15 2# 2x15 2x15 2# 2x15 2#   Hip Add SLR  2x15 2x15 2# 2x15 2x15 2# 2x15 2#    Hip Flexion SLR         Lat band walk  hold 2x20 btb 2x20 black 2x20 black 2x20 black   SL Foam  2x30\" 2x30\"  2x30\" 2x30\"            Ther Ex         NS w/ HR  6' 6' 6' 6' 6'   HEP reve 8'        prostretch  4x30\" 4x30\" 4x30\" 4x30\" 4x30\"                                                         Ther Activity                           Gait Training                           Modalities                         "

## 2024-08-14 ENCOUNTER — OFFICE VISIT (OUTPATIENT)
Age: 39
End: 2024-08-14
Payer: COMMERCIAL

## 2024-08-14 DIAGNOSIS — M79.662 PAIN OF LEFT CALF: Primary | ICD-10-CM

## 2024-08-14 PROCEDURE — 97112 NEUROMUSCULAR REEDUCATION: CPT | Performed by: PHYSICAL THERAPIST

## 2024-08-14 PROCEDURE — 97110 THERAPEUTIC EXERCISES: CPT | Performed by: PHYSICAL THERAPIST

## 2024-08-14 NOTE — PROGRESS NOTES
"Daily Note     Today's date: 2024  Patient name: Maggie Bullock  : 1985  MRN: [id number]  Referring provider: Norbert Murray MD  Dx:   Encounter Diagnosis     ICD-10-CM    1. Pain of left calf  M79.662           Start Time: 1016  Stop Time: 1055  Total time in clinic (min): 39 minutes    Subjective: Reports a small \"twinge\" in L calf after workout, as well as L knee and R hip. States that she can tell improvement is stemming from PT.      Objective: See treatment diary below      Assessment: Pt tolerated treatment well, did not note inc L calf pain at end of session today. Progressed load and volume of activity during this session to continue challenging the pt. Pt notes that she still is not completing high-impact activity at gym, due to pain in L calf and L knee. Pt would benefit from continued PT and would benefit from addition of plyometric activity to facilitate return to PLOF.      Plan: Continue per plan of care. Progress interventions as tolerated and trial plyometrics.     Precautions: universal    POC expires Unit limit Auth Expiration date PT/OT + Visit Limit?   24         Visit/Unit Tracking  AUTH Status:  Date    Req post 24v Used 1 2 3 4 5 6 7    Remaining              Pertinent Findings:      POC End Date: 24                                                                                          Test / Measure     FOTO (Predicted 52) 72   SL HR test R/L 20/5   L Hip abd 3+/5           Manuals  8/   Cupping w/ movt  KS SS held      Piriformis S  3x30\" 3x30\"  3x30\" 3x30\"                        Neuro Re-Ed          SL Eccentric HR   2x10 10# held      HR on block  2x20 2x20 2x20 2x20 2x20 3x20   Hip abd SLR  2x15 2x15 2# 2x15 2x15 2# 2x15 2# 1x15 2#  1x15 3# B   Hip Add SLR  2x15 2x15 2# 2x15 2x15 2# 2x15 2#  1x15 2#  1x15 3# B   Hip Flexion SLR          Lat band walk  hold 2x20 btb 2x20 black 2x20 black 2x20 black " "4x20 black   SL Foam  2x30\" 2x30\"  2x30\" 2x30\" 2x30\" B             Ther Ex          NS w/ HR  6' 6' 6' 6' 6' 6'   HEP reve 8'         prostretch  4x30\" 4x30\" 4x30\" 4x30\" 4x30\" 4x30\" B                                                               Ther Activity                              Gait Training                              Modalities                                             "

## 2024-08-16 ENCOUNTER — OFFICE VISIT (OUTPATIENT)
Age: 39
End: 2024-08-16
Payer: COMMERCIAL

## 2024-08-16 DIAGNOSIS — M79.662 PAIN OF LEFT CALF: Primary | ICD-10-CM

## 2024-08-16 PROCEDURE — 97110 THERAPEUTIC EXERCISES: CPT | Performed by: PHYSICAL THERAPIST

## 2024-08-16 PROCEDURE — 97112 NEUROMUSCULAR REEDUCATION: CPT | Performed by: PHYSICAL THERAPIST

## 2024-08-16 NOTE — PROGRESS NOTES
"Daily Note     Today's date: 2024  Patient name: Maggie Bullock  : 1985  MRN: [id number]  Referring provider: Norbert Murray MD  Dx:   Encounter Diagnosis     ICD-10-CM    1. Pain of left calf  M79.662           Start Time: 1022  Stop Time: 1100  Total time in clinic (min): 38 minutes    Subjective: Pt reports generally progressing well, reduced pain, mild increased today post workout.     Objective: See treatment diary below    Assessment: Tolerated treatment well. Patient demonstrated fatigue post treatment, exhibited good technique with therapeutic exercises, and would benefit from continued PT 1:1 with Андрей Zarate DPT for entirety of tx. Reintroduced eccentric HR loading evaluated response nv.     Plan: Continue per plan of care.      Precautions: universal    POC expires Unit limit Auth Expiration date PT/OT + Visit Limit?   24         Visit/Unit Tracking  AUTH Status:  Date    Req post 24v Used 1 2 3 4 5 6 7 8    Remaining               Pertinent Findings:      POC End Date: 24                                                                                          Test / Measure      FOTO (Predicted 72) 52    SL HR test R/L 20/5    L Hip abd 3+/5             Manuals    Cupping w/ movt  KS SS held       Piriformis S  3x30\" 3x30\"  3x30\" 3x30\"                           Neuro Re-Ed           SL Eccentric HR   2x10 10# held    2x10   HR on block  2x20 2x20 2x20 2x20 2x20 3x20 3x20   Hip abd SLR  2x15 2x15 2# 2x15 2x15 2# 2x15 2# 1x15 2#  1x15 3# B 2x15 3#   Hip Add SLR  2x15 2x15 2# 2x15 2x15 2# 2x15 2#  1x15 2#  1x15 3# B 2x15 3#   Hip Flexion SLR           Lat band walk  hold 2x20 btb 2x20 black 2x20 black 2x20 black 4x20 black 4x20 black   SL Foam  2x30\" 2x30\"  2x30\" 2x30\" 2x30\" B 2x30\" B   Nose leans        2x20\"   Ther Ex           NS w/ HR  6' 6' 6' 6' 6' 6' 6'   HEP reve 8'          prostretch  4x30\" " "4x30\" 4x30\" 4x30\" 4x30\" 4x30\" B 4x30\" B                                                                     Ther Activity                                 Gait Training                                 Modalities                                                  "

## 2024-08-21 ENCOUNTER — OFFICE VISIT (OUTPATIENT)
Age: 39
End: 2024-08-21
Payer: COMMERCIAL

## 2024-08-21 DIAGNOSIS — M79.662 PAIN OF LEFT CALF: Primary | ICD-10-CM

## 2024-08-21 PROCEDURE — 97112 NEUROMUSCULAR REEDUCATION: CPT | Performed by: PHYSICAL THERAPIST

## 2024-08-21 PROCEDURE — 97110 THERAPEUTIC EXERCISES: CPT | Performed by: PHYSICAL THERAPIST

## 2024-08-21 NOTE — PROGRESS NOTES
"Daily Note     Today's date: 2024  Patient name: Maggie Bullock  : 1985  MRN: [id number]  Referring provider: Norbert Murray MD  Dx:   Encounter Diagnosis     ICD-10-CM    1. Pain of left calf  M79.662           Start Time: 1020  Stop Time: 1100  Total time in clinic (min): 40 minutes    Subjective: Pt reports having mm soreness for 24-48hr after last session. Exercised this morning and had \"twinge\" in calf but it didn't last.     Objective: See treatment diary below    Assessment: Tolerated treatment well. Pt demonstrating increased activity tolerance and was able to increase volume where indicated.Patient demonstrated fatigue post treatment, exhibited good technique with therapeutic exercises, and would benefit from continued PT     Plan: Continue per plan of care.      Precautions: universal    POC expires Unit limit Auth Expiration date PT/OT + Visit Limit?   24         Visit/Unit Tracking  AUTH Status:  Date    Req post 24v Used 1 2 3 4 5 6 7 8 9    Remaining                Pertinent Findings:      POC End Date: 24                                                                                          Test / Measure      FOTO (Predicted 72) 52    SL HR test R/L 20/5    L Hip abd 3+/5             Manuals    Cupping w/ movt  KS SS held        Piriformis S  3x30\" 3x30\"  3x30\" 3x30\"                              Neuro Re-Ed            SL Eccentric HR   2x10 10# held    2x10 2x20   HR on block  2x20 2x20 2x20 2x20 2x20 3x20 3x20 3x20   Hip abd SLR  2x15 2x15 2# 2x15 2x15 2# 2x15 2# 1x15 2#  1x15 3# B 2x15 3# D/C   Hip Add SLR  2x15 2x15 2# 2x15 2x15 2# 2x15 2#  1x15 2#  1x15 3# B 2x15 3# D/C   Hip Flexion SLR            Lat band walk  hold 2x20 btb 2x20 black 2x20 black 2x20 black 4x20 black 4x20 black Blk 20ft   x6   SL Foam  2x30\" 2x30\"  2x30\" 2x30\" 2x30\" B 2x30\" B 3x30\" B   Nose leans        2x20\" 2x20\" " "  SLS 4-way Oscillation         [place] 8# x20\" ea (B)   Ther Ex            NS w/ HR  6' 6' 6' 6' 6' 6' 6' 6'   HEP reve 8'           prostretch  4x30\" 4x30\" 4x30\" 4x30\" 4x30\" 4x30\" B 4x30\" B 4x30\" B                                                                           Ther Activity                                    Gait Training                                    Modalities                                                     "

## 2024-08-23 ENCOUNTER — OFFICE VISIT (OUTPATIENT)
Age: 39
End: 2024-08-23
Payer: COMMERCIAL

## 2024-08-23 DIAGNOSIS — M79.662 PAIN OF LEFT CALF: Primary | ICD-10-CM

## 2024-08-23 PROCEDURE — 97110 THERAPEUTIC EXERCISES: CPT | Performed by: PHYSICAL THERAPIST

## 2024-08-23 PROCEDURE — 97112 NEUROMUSCULAR REEDUCATION: CPT | Performed by: PHYSICAL THERAPIST

## 2024-08-28 ENCOUNTER — APPOINTMENT (OUTPATIENT)
Age: 39
End: 2024-08-28
Payer: COMMERCIAL

## 2025-01-06 ENCOUNTER — TELEPHONE (OUTPATIENT)
Dept: ADMINISTRATIVE | Facility: OTHER | Age: 40
End: 2025-01-06

## 2025-01-06 NOTE — TELEPHONE ENCOUNTER
Upon review of the In Basket request we were able to locate, review, and update the patient chart as requested for Hepatitis C , HIV, and Pap Smear (HPV) aka Cervical Cancer Screening.    Any additional questions or concerns should be emailed to the Practice Liaisons via the appropriate education email address, please do not reply via In Basket.    Thank you  Damir Johnston MA   [facility]

## 2025-01-06 NOTE — TELEPHONE ENCOUNTER
----- Message from Veronica POPE sent at 1/6/2025 11:22 AM EST -----  Regarding: Hep C/ HCV  Pap Smear  01/06/25 11:22 AM    Hello, our patient Maggie Bullock has had Hepatitis C and HIV completed/performed. Please assist in updating the patient chart by pulling the Care Everywhere (CE) document. The date of service is 04/06/21.     Thank you,  Veronica Gandara MA [facility]       01/06/25 11:22 AM    Hello, our patient Maggie Bullock has had Pap Smear (HPV) aka Cervical Cancer Screening completed/performed. Please assist in updating the patient chart by pulling the Care Everywhere (CE) document. The date of service is 04/27/21.     Thank you,  Veronica Gandara MA [facility]

## 2025-01-08 ENCOUNTER — TELEPHONE (OUTPATIENT)
Age: 40
End: 2025-01-08

## 2025-01-08 ENCOUNTER — OFFICE VISIT (OUTPATIENT)
Age: 40
End: 2025-01-08
Payer: COMMERCIAL

## 2025-01-08 VITALS
HEART RATE: 91 BPM | DIASTOLIC BLOOD PRESSURE: 81 MMHG | WEIGHT: 204.4 LBS | BODY MASS INDEX: 30.98 KG/M2 | OXYGEN SATURATION: 99 % | SYSTOLIC BLOOD PRESSURE: 120 MMHG | HEIGHT: 68 IN

## 2025-01-08 DIAGNOSIS — Z12.4 CERVICAL CANCER SCREENING: ICD-10-CM

## 2025-01-08 DIAGNOSIS — E66.811 CLASS 1 OBESITY DUE TO EXCESS CALORIES WITHOUT SERIOUS COMORBIDITY WITH BODY MASS INDEX (BMI) OF 31.0 TO 31.9 IN ADULT: ICD-10-CM

## 2025-01-08 DIAGNOSIS — E66.09 CLASS 1 OBESITY DUE TO EXCESS CALORIES WITHOUT SERIOUS COMORBIDITY WITH BODY MASS INDEX (BMI) OF 31.0 TO 31.9 IN ADULT: ICD-10-CM

## 2025-01-08 DIAGNOSIS — Z00.00 ANNUAL PHYSICAL EXAM: Primary | ICD-10-CM

## 2025-01-08 DIAGNOSIS — L98.9 SKIN LESION: ICD-10-CM

## 2025-01-08 PROCEDURE — 99213 OFFICE O/P EST LOW 20 MIN: CPT | Performed by: FAMILY MEDICINE

## 2025-01-08 PROCEDURE — 99385 PREV VISIT NEW AGE 18-39: CPT | Performed by: FAMILY MEDICINE

## 2025-01-08 NOTE — PROGRESS NOTES
Adult Annual Physical  Name: Maggie Bullock      : 1985      MRN: [id number]  Encounter Provider: JOYCELYN Jenkins  Encounter Date: 2025   Encounter department: [facility]    Assessment & Plan  Annual physical exam    Orders:    Lipid panel; Future    Comprehensive metabolic panel; Future    CBC and differential; Future    TSH, 3rd generation; Future    Class 1 obesity due to excess calories without serious comorbidity with body mass index (BMI) of 31.0 to 31.9 in adult      Orders:    Lipid panel; Future    Comprehensive metabolic panel; Future    CBC and differential; Future    TSH, 3rd generation; Future    Cervical cancer screening    Orders:    Ambulatory Referral to Obstetrics / Gynecology; Future    Skin lesion  Patient and myself unable to palpate it during the visit. Patient instructed to keep an eye on it and report any changes in size.         Immunizations and preventive care screenings were discussed with patient today. Appropriate education was printed on patient's after visit summary.    Counseling:  Dental Health: discussed importance of regular tooth brushing, flossing, and dental visits.         History of Present Illness     Adult Annual Physical:  Patient presents for annual physical. Patient here to establish care. Patient noticed a lump to left flank area. Has not change in size. Denies pain. .     Diet and Physical Activity:  - Diet/Nutrition: well balanced diet.  - Exercise: moderate cardiovascular exercise and strength training exercises.    General Health:  - Sleep: sleeps well.  - Hearing: normal hearing bilateral ears.  - Vision: wears contacts and wears glasses.  - Dental: brushes teeth twice daily.    /GYN Health:  - Follows with GYN: no.   - Last menstrual cycle: 2024.   - Contraception:. Last PAP was 3 years ago      Review of Systems   Constitutional:  Negative for chills and fever.   HENT:  Negative for ear pain and sore throat.   "  Eyes:  Negative for pain and visual disturbance.   Respiratory:  Negative for cough and shortness of breath.    Cardiovascular:  Negative for chest pain and palpitations.   Gastrointestinal:  Negative for abdominal pain and vomiting.   Genitourinary:  Negative for dysuria and hematuria.   Musculoskeletal:  Negative for arthralgias and back pain.   Skin:  Negative for color change and rash.        Pea size growth to left posterior back, under ribcage   Neurological:  Negative for seizures and syncope.   All other systems reviewed and are negative.        Objective   /81 (BP Location: Left arm, Patient Position: Sitting, Cuff Size: Standard)   Pulse 91   Ht 5' 7.5\" (1.715 m)   Wt 92.7 kg (204 lb 6.4 oz)   SpO2 99%   BMI 31.54 kg/m²     Physical Exam  Vitals and nursing note reviewed.   Constitutional:       General: She is not in acute distress.     Appearance: She is well-developed.   HENT:      Head: Normocephalic and atraumatic.   Eyes:      Conjunctiva/sclera: Conjunctivae normal.   Cardiovascular:      Rate and Rhythm: Normal rate and regular rhythm.      Heart sounds: No murmur heard.  Pulmonary:      Effort: Pulmonary effort is normal. No respiratory distress.      Breath sounds: Normal breath sounds.   Abdominal:      Palpations: Abdomen is soft.      Tenderness: There is no abdominal tenderness.   Musculoskeletal:         General: No swelling.      Cervical back: Neck supple.   Skin:     General: Skin is warm and dry.      Capillary Refill: Capillary refill takes less than 2 seconds.   Neurological:      Mental Status: She is alert.   Psychiatric:         Mood and Affect: Mood normal.         "

## 2025-01-08 NOTE — PATIENT INSTRUCTIONS
"Patient Education     Routine physical for adults   The Basics   Written by the doctors and editors at [facility]   What is a physical? -- A physical is a routine visit, or \"check-up,\" with your doctor. You might also hear it called a \"wellness visit\" or \"preventive visit.\"  During each visit, the doctor will:   Ask about your physical and mental health   Ask about your habits, behaviors, and lifestyle   Do an exam   Give you vaccines if needed   Talk to you about any medicines you take   Give advice about your health   Answer your questions  Getting regular check-ups is an important part of taking care of your health. It can help your doctor find and treat any problems you have. But it's also important for preventing health problems.  A routine physical is different from a \"sick visit.\" A sick visit is when you see a doctor because of a health concern or problem. Since physicals are scheduled ahead of time, you can think about what you want to ask the doctor.  How often should I get a physical? -- It depends on your age and health. In general, for people age 21 years and older:   If you are younger than 50 years, you might be able to get a physical every 3 years.   If you are 50 years or older, your doctor might recommend a physical every year.  If you have an ongoing health condition, like diabetes or high blood pressure, your doctor will probably want to see you more often.  What happens during a physical? -- In general, each visit will include:   Physical exam - The doctor or nurse will check your height, weight, heart rate, and blood pressure. They will also look at your eyes and ears. They will ask about how you are feeling and whether you have any symptoms that bother you.   Medicines - It's a good idea to bring a list of all the medicines you take to each doctor visit. Your doctor will talk to you about your medicines and answer any questions. Tell them if you are having any side effects that bother you. You " "should also tell them if you are having trouble paying for any of your medicines.   Habits and behaviors - This includes:   Your diet   Your exercise habits   Whether you smoke, drink alcohol, or use drugs   Whether you are sexually active   Whether you feel safe at home  Your doctor will talk to you about things you can do to improve your health and lower your risk of health problems. They will also offer help and support. For example, if you want to quit smoking, they can give you advice and might prescribe medicines. If you want to improve your diet or get more physical activity, they can help you with this, too.   Lab tests, if needed - The tests you get will depend on your age and situation. For example, your doctor might want to check your:   Cholesterol   Blood sugar   Iron level   Vaccines - The recommended vaccines will depend on your age, health, and what vaccines you already had. Vaccines are very important because they can prevent certain serious or deadly infections.   Discussion of screening - \"Screening\" means checking for diseases or other health problems before they cause symptoms. Your doctor can recommend screening based on your age, risk, and preferences. This might include tests to check for:   Cancer, such as breast, prostate, cervical, ovarian, colorectal, prostate, lung, or skin cancer   Sexually transmitted infections, such as chlamydia and gonorrhea   Mental health conditions like depression and anxiety  Your doctor will talk to you about the different types of screening tests. They can help you decide which screenings to have. They can also explain what the results might mean.   Answering questions - The physical is a good time to ask the doctor or nurse questions about your health. If needed, they can refer you to other doctors or specialists, too.  Adults older than 65 years often need other care, too. As you get older, your doctor will talk to you about:   How to prevent falling at " home   Hearing or vision tests   Memory testing   How to take your medicines safely   Making sure that you have the help and support you need at home  All topics are updated as new evidence becomes available and our peer review process is complete.  This topic retrieved from [employer] on: May 02, 2024.  Topic 554265 Version 1.0  Release: 32.4.3 - C32.122  © 2024 UpToDate, Inc. and/or its affiliates. All rights reserved.  Consumer Information Use and Disclaimer   Disclaimer: This generalized information is a limited summary of diagnosis, treatment, and/or medication information. It is not meant to be comprehensive and should be used as a tool to help the user understand and/or assess potential diagnostic and treatment options. It does NOT include all information about conditions, treatments, medications, side effects, or risks that may apply to a specific patient. It is not intended to be medical advice or a substitute for the medical advice, diagnosis, or treatment of a health care provider based on the health care provider's examination and assessment of a patient's specific and unique circumstances. Patients must speak with a health care provider for complete information about their health, medical questions, and treatment options, including any risks or benefits regarding use of medications. This information does not endorse any treatments or medications as safe, effective, or approved for treating a specific patient. UpToDate, Inc. and its affiliates disclaim any warranty or liability relating to this information or the use thereof.The use of this information is governed by the Terms of Use, available at https://www.w[employer]tersMediaScrapeuwer.com/en/know/clinical-effectiveness-terms. 2024© UpToDate, Inc. and its affiliates and/or licensors. All rights reserved.  Copyright   © 2024 UpToDate, Inc. and/or its affiliates. All rights reserved.

## 2025-01-13 DIAGNOSIS — Z00.6 ENCOUNTER FOR EXAMINATION FOR NORMAL COMPARISON OR CONTROL IN CLINICAL RESEARCH PROGRAM: ICD-10-CM

## 2025-01-23 ENCOUNTER — APPOINTMENT (OUTPATIENT)
Age: 40
End: 2025-01-23
Payer: COMMERCIAL

## 2025-01-23 DIAGNOSIS — E66.811 CLASS 1 OBESITY DUE TO EXCESS CALORIES WITHOUT SERIOUS COMORBIDITY WITH BODY MASS INDEX (BMI) OF 31.0 TO 31.9 IN ADULT: ICD-10-CM

## 2025-01-23 DIAGNOSIS — E66.09 CLASS 1 OBESITY DUE TO EXCESS CALORIES WITHOUT SERIOUS COMORBIDITY WITH BODY MASS INDEX (BMI) OF 31.0 TO 31.9 IN ADULT: ICD-10-CM

## 2025-01-23 DIAGNOSIS — Z00.6 ENCOUNTER FOR EXAMINATION FOR NORMAL COMPARISON OR CONTROL IN CLINICAL RESEARCH PROGRAM: ICD-10-CM

## 2025-01-23 DIAGNOSIS — Z00.00 ANNUAL PHYSICAL EXAM: ICD-10-CM

## 2025-01-23 LAB
ALBUMIN SERPL BCG-MCNC: 4.2 G/DL (ref 3.5–5)
ALP SERPL-CCNC: 44 U/L (ref 34–104)
ALT SERPL W P-5'-P-CCNC: 4 U/L (ref 7–52)
ANION GAP SERPL CALCULATED.3IONS-SCNC: 7 MMOL/L (ref 4–13)
AST SERPL W P-5'-P-CCNC: 12 U/L (ref 13–39)
BASOPHILS # BLD AUTO: 0.08 THOUSANDS/ΜL (ref 0–0.1)
BASOPHILS NFR BLD AUTO: 2 % (ref 0–1)
BILIRUB SERPL-MCNC: 0.56 MG/DL (ref 0.2–1)
BUN SERPL-MCNC: 8 MG/DL (ref 5–25)
CALCIUM SERPL-MCNC: 9 MG/DL (ref 8.4–10.2)
CHLORIDE SERPL-SCNC: 104 MMOL/L (ref 96–108)
CHOLEST SERPL-MCNC: 193 MG/DL (ref ?–200)
CO2 SERPL-SCNC: 27 MMOL/L (ref 21–32)
CREAT SERPL-MCNC: 0.79 MG/DL (ref 0.6–1.3)
EOSINOPHIL # BLD AUTO: 0.09 THOUSAND/ΜL (ref 0–0.61)
EOSINOPHIL NFR BLD AUTO: 2 % (ref 0–6)
ERYTHROCYTE [DISTWIDTH] IN BLOOD BY AUTOMATED COUNT: 15 % (ref 11.6–15.1)
GFR SERPL CREATININE-BSD FRML MDRD: 94 ML/MIN/1.73SQ M
GLUCOSE P FAST SERPL-MCNC: 100 MG/DL (ref 65–99)
HCT VFR BLD AUTO: 37.7 % (ref 34.8–46.1)
HDLC SERPL-MCNC: 55 MG/DL
HGB BLD-MCNC: 11.7 G/DL (ref 11.5–15.4)
IMM GRANULOCYTES # BLD AUTO: 0.01 THOUSAND/UL (ref 0–0.2)
IMM GRANULOCYTES NFR BLD AUTO: 0 % (ref 0–2)
LDLC SERPL CALC-MCNC: 117 MG/DL (ref 0–100)
LYMPHOCYTES # BLD AUTO: 1.77 THOUSANDS/ΜL (ref 0.6–4.47)
LYMPHOCYTES NFR BLD AUTO: 35 % (ref 14–44)
MCH RBC QN AUTO: 24.7 PG (ref 26.8–34.3)
MCHC RBC AUTO-ENTMCNC: 31 G/DL (ref 31.4–37.4)
MCV RBC AUTO: 80 FL (ref 82–98)
MONOCYTES # BLD AUTO: 0.4 THOUSAND/ΜL (ref 0.17–1.22)
MONOCYTES NFR BLD AUTO: 8 % (ref 4–12)
NEUTROPHILS # BLD AUTO: 2.7 THOUSANDS/ΜL (ref 1.85–7.62)
NEUTS SEG NFR BLD AUTO: 53 % (ref 43–75)
NONHDLC SERPL-MCNC: 138 MG/DL
NRBC BLD AUTO-RTO: 0 /100 WBCS
PLATELET # BLD AUTO: 264 THOUSANDS/UL (ref 149–390)
PMV BLD AUTO: 11.2 FL (ref 8.9–12.7)
POTASSIUM SERPL-SCNC: 4.1 MMOL/L (ref 3.5–5.3)
PROT SERPL-MCNC: 6.2 G/DL (ref 6.4–8.4)
RBC # BLD AUTO: 4.74 MILLION/UL (ref 3.81–5.12)
SODIUM SERPL-SCNC: 138 MMOL/L (ref 135–147)
TRIGL SERPL-MCNC: 107 MG/DL (ref ?–150)
TSH SERPL DL<=0.05 MIU/L-ACNC: 1.67 UIU/ML (ref 0.45–4.5)
WBC # BLD AUTO: 5.05 THOUSAND/UL (ref 4.31–10.16)

## 2025-01-23 PROCEDURE — 80061 LIPID PANEL: CPT

## 2025-01-23 PROCEDURE — 80053 COMPREHEN METABOLIC PANEL: CPT

## 2025-01-23 PROCEDURE — 84443 ASSAY THYROID STIM HORMONE: CPT

## 2025-01-23 PROCEDURE — 36415 COLL VENOUS BLD VENIPUNCTURE: CPT

## 2025-01-23 PROCEDURE — 85025 COMPLETE CBC W/AUTO DIFF WBC: CPT

## 2025-01-29 ENCOUNTER — OFFICE VISIT (OUTPATIENT)
Age: 40
End: 2025-01-29
Payer: COMMERCIAL

## 2025-01-29 VITALS
BODY MASS INDEX: 30.92 KG/M2 | DIASTOLIC BLOOD PRESSURE: 80 MMHG | WEIGHT: 204 LBS | HEIGHT: 68 IN | SYSTOLIC BLOOD PRESSURE: 123 MMHG | HEART RATE: 89 BPM | OXYGEN SATURATION: 98 %

## 2025-01-29 DIAGNOSIS — R73.02 IMPAIRED GLUCOSE TOLERANCE: Primary | ICD-10-CM

## 2025-01-29 PROBLEM — E66.9 OBESITY (BMI 30-39.9): Status: ACTIVE | Noted: 2025-01-29

## 2025-01-29 PROCEDURE — 99213 OFFICE O/P EST LOW 20 MIN: CPT | Performed by: FAMILY MEDICINE

## 2025-01-29 NOTE — PROGRESS NOTES
Name: Maggie Bullock      : 1985      MRN: [id number]  Encounter Provider: JOYCELYN Jenkins  Encounter Date: 2025   Encounter department: [facility]  :  Assessment & Plan  Impaired glucose tolerance    Orders:    CBC and differential; Future    Comprehensive metabolic panel; Future          Depression Screening and Follow-up Plan: Patient was screened for depression during today's encounter. They screened negative with a PHQ-2 score of 0.      History of Present Illness     Maggie Bullock is here for chronic conditions f/u. Pt. had labs done prior to today's visit which included Recent Results (from the past 4 weeks)  -Lipid panel:   Collection Time: 25  9:35 AM       Result                      Value             Ref Range           Cholesterol                 193               See Comment *       Triglycerides               107               See Comment *       HDL, Direct                 55                >=50 mg/dL          LDL Calculated              117 (H)           0 - 100 mg/dL       Non-HDL-Chol (CHOL-HDL)     138               mg/dl          -Comprehensive metabolic panel:   Collection Time: 25  9:35 AM       Result                      Value             Ref Range           Sodium                      138               135 - 147 mm*       Potassium                   4.1               3.5 - 5.3 mm*       Chloride                    104               96 - 108 mmo*       CO2                         27                21 - 32 mmol*       ANION GAP                   7                 4 - 13 mmol/L       BUN                         8                 5 - 25 mg/dL        Creatinine                  0.79              0.60 - 1.30 *       Glucose, Fasting            100 (H)           65 - 99 mg/dL       Calcium                     9.0               8.4 - 10.2 m*       AST                         12 (L)            13 - 39 U/L         ALT                         4 (L)              7 - 52 U/L          Alkaline Phosphatase        44                34 - 104 U/L        Total Protein               6.2 (L)           6.4 - 8.4 g/*       Albumin                     4.2               3.5 - 5.0 g/*       Total Bilirubin             0.56              0.20 - 1.00 *       eGFR                        94                ml/min/1.73s*  -CBC and differential:   Collection Time: 01/23/25  9:35 AM       Result                      Value             Ref Range           WBC                         5.05              4.31 - 10.16*       RBC                         4.74              3.81 - 5.12 *       Hemoglobin                  11.7              11.5 - 15.4 *       Hematocrit                  37.7              34.8 - 46.1 %       MCV                         80 (L)            82 - 98 fL          MCH                         24.7 (L)          26.8 - 34.3 *       MCHC                        31.0 (L)          31.4 - 37.4 *       RDW                         15.0              11.6 - 15.1 %       MPV                         11.2              8.9 - 12.7 fL       Platelets                   264               149 - 390 Th*       nRBC                        0                 /100 WBCs           Segmented %                 53                43 - 75 %           Immature Grans %            0                 0 - 2 %             Lymphocytes %               35                14 - 44 %           Monocytes %                 8                 4 - 12 %            Eosinophils Relative        2                 0 - 6 %             Basophils Relative          2 (H)             0 - 1 %             Absolute Neutrophils        2.70              1.85 - 7.62 *       Absolute Immature Grans     0.01              0.00 - 0.20 *       Absolute Lymphocytes        1.77              0.60 - 4.47 *       Absolute Monocytes          0.40              0.17 - 1.22 *       Eosinophils Absolute        0.09              0.00 - 0.61 *       Basophils  "Absolute          0.08              0.00 - 0.10 *  -TSH, 3rd generation:   Collection Time: 01/23/25  9:35 AM       Result                      Value             Ref Range           TSH 3RD GENERATON           1.674             0.450 - 4.50*        Review of Systems   Constitutional:  Negative for chills and fever.   HENT:  Negative for ear pain and sore throat.    Eyes:  Negative for pain and visual disturbance.   Respiratory:  Negative for cough and shortness of breath.    Cardiovascular:  Negative for chest pain and palpitations.   Gastrointestinal:  Negative for abdominal pain and vomiting.   Genitourinary:  Negative for dysuria and hematuria.   Musculoskeletal:  Negative for arthralgias and back pain.   Skin:  Negative for color change and rash.   Neurological:  Negative for seizures and syncope.   All other systems reviewed and are negative.      Objective   /80 (BP Location: Left arm, Patient Position: Sitting, Cuff Size: Standard)   Pulse 89   Ht 5' 7.5\" (1.715 m)   Wt 92.5 kg (204 lb)   SpO2 98%   BMI 31.48 kg/m²      Physical Exam  Vitals and nursing note reviewed.   Constitutional:       General: She is not in acute distress.     Appearance: She is well-developed. She is obese.   HENT:      Head: Normocephalic and atraumatic.   Eyes:      Conjunctiva/sclera: Conjunctivae normal.   Cardiovascular:      Rate and Rhythm: Normal rate and regular rhythm.      Heart sounds: No murmur heard.  Pulmonary:      Effort: Pulmonary effort is normal. No respiratory distress.      Breath sounds: Normal breath sounds.   Abdominal:      Palpations: Abdomen is soft.      Tenderness: There is no abdominal tenderness.   Musculoskeletal:         General: No swelling.      Cervical back: Neck supple.   Skin:     General: Skin is warm and dry.      Capillary Refill: Capillary refill takes less than 2 seconds.   Neurological:      Mental Status: She is alert.   Psychiatric:         Mood and Affect: Mood normal. "

## 2025-02-04 LAB
APOB+LDLR+PCSK9 GENE MUT ANL BLD/T: NOT DETECTED
BRCA1+BRCA2 DEL+DUP + FULL MUT ANL BLD/T: NOT DETECTED
MLH1+MSH2+MSH6+PMS2 GN DEL+DUP+FUL M: NOT DETECTED

## 2025-02-10 ENCOUNTER — OFFICE VISIT (OUTPATIENT)
Age: 40
End: 2025-02-10
Payer: COMMERCIAL

## 2025-02-10 ENCOUNTER — TELEPHONE (OUTPATIENT)
Age: 40
End: 2025-02-10

## 2025-02-10 VITALS
BODY MASS INDEX: 31.13 KG/M2 | DIASTOLIC BLOOD PRESSURE: 72 MMHG | WEIGHT: 205.4 LBS | HEIGHT: 68 IN | SYSTOLIC BLOOD PRESSURE: 122 MMHG

## 2025-02-10 DIAGNOSIS — Z31.9 INFERTILITY MANAGEMENT: ICD-10-CM

## 2025-02-10 DIAGNOSIS — Z12.31 VISIT FOR SCREENING MAMMOGRAM: ICD-10-CM

## 2025-02-10 DIAGNOSIS — Z11.3 SCREENING FOR STDS (SEXUALLY TRANSMITTED DISEASES): ICD-10-CM

## 2025-02-10 DIAGNOSIS — Z80.3 FAMILY HISTORY OF BREAST CANCER: ICD-10-CM

## 2025-02-10 DIAGNOSIS — Z01.419 WELL FEMALE EXAM WITH ROUTINE GYNECOLOGICAL EXAM: Primary | ICD-10-CM

## 2025-02-10 DIAGNOSIS — Z12.4 CERVICAL CANCER SCREENING: ICD-10-CM

## 2025-02-10 PROCEDURE — G0476 HPV COMBO ASSAY CA SCREEN: HCPCS | Performed by: PHYSICIAN ASSISTANT

## 2025-02-10 PROCEDURE — 99385 PREV VISIT NEW AGE 18-39: CPT | Performed by: PHYSICIAN ASSISTANT

## 2025-02-10 PROCEDURE — G0145 SCR C/V CYTO,THINLAYER,RESCR: HCPCS | Performed by: PHYSICIAN ASSISTANT

## 2025-02-10 PROCEDURE — 87591 N.GONORRHOEAE DNA AMP PROB: CPT | Performed by: PHYSICIAN ASSISTANT

## 2025-02-10 PROCEDURE — 87491 CHLMYD TRACH DNA AMP PROBE: CPT | Performed by: PHYSICIAN ASSISTANT

## 2025-02-10 NOTE — PROGRESS NOTES
Patient here for ROUTINE GYN EXAM.    Patient is 39 y.o. year old female G 4 P 1.  She is here today for her routine gyn exam. She is using nothing for birth control.     Menses every 21-28 days, duration x 5 days.  Pt does not smoke.  She denies alcohol/drug abuse.  She denies domestic violence/abuse.  She declines HIV testing.  She denies problems with her breasts, bladder, or bowel.      No birth control X 1 year.  Has not gotten pregnant.   Reviewed risk of pregnancy with age including but not limited to increased risk of baby with chromosomal abnormality, HTN / diabetes with pregnancy.   Recommend w/u with infertility specialist due to age.   Info given.      Pt with + history of abnormal pap smears.  We discussed the current ACOG pap guidelines.Last pap was 4/27/21 and results were negative, negative.      S/p EUGENIO 2003    Domestic violence screen: negative      The patients medical, surgical, and family history was reviewed and updated.     Last mammogram: none        Family History breast cancer:  sister @ 40  Family History ovarian cancer: no  Thyroid cancer: sister  MGF: bladder cancer  PGF: prostate cancer  Mother: melanoma    Patient had negative helix testing.  Her sister is planning to complete genetic testing.  Will await sisters results.  Discussed that more inclusive testing would be done thru genetics.  Recommend mammogram now and then additional breast screening based on results/sister genetic testing.      No current outpatient medications on file prior to visit.     No current facility-administered medications on file prior to visit.       Allergies   Allergen Reactions    Penicillins Hives         Past Surgical History:   Procedure Laterality Date    CERVICAL BIOPSY  W/ LOOP ELECTRODE EXCISION      HERNIA REPAIR  1985    Double hernia repair at 10wks old    MOUTH SURGERY             Review of Systems   Constitutional: Negative.    HENT: Negative.    Eyes: Negative.    Respiratory: Negative.   "  Cardiovascular: Negative.    Gastrointestinal: Negative.    Endocrine: Negative.    Genitourinary:        As noted in HPI   Musculoskeletal: Negative.    Skin: Negative.    Allergic/Immunologic: Negative.    Neurological: Negative.    Hematological: Negative.    Psychiatric/Behavioral: Negative      Vitals:    02/10/25 0851   BP: 122/72   BP Location: Left arm   Patient Position: Sitting   Cuff Size: Standard   Weight: 93.2 kg (205 lb 6.4 oz)   Height: 5' 7.5\" (1.715 m)       Chaperone was present during exam.    EXAM:    Neck: supple without nodes or thyromegaly  Heart: regular rate and rhythm  Lungs: clear to auscultation without rales, rhonci  Breasts: nontender, no masses, no discoloration, no discharge  Abdomen: soft, nontender, no masses  Ext genitalia: no lesions, no discoloration  Urethra: no discharge or erythema  Bladder: nontender, good support  Vagina: no discharge, no lesions  Cervix: no lesions, no cervical motion tenderness  Adnexa: nontender, no masses  Uterus: nontender, normal size and shape      Assessment & Plan  Well female exam with routine gynecological exam  Pap  done today.  Nothing for birth control  mammogram ordered due to family history       Family history of breast cancer    Orders:    Mammo screening bilateral w 3d and cad; Future  Discussed option for additional screening with ABUS/ vs Breast MRI pending mammo results and sister's genetic testing.  Screening for STDs (sexually transmitted diseases)    Orders:    Chlamydia/GC amplified DNA by PCR    Infertility management  Referral to fertility specialist, info given.  Patient will call if she wants referral sent.               "

## 2025-02-10 NOTE — PATIENT INSTRUCTIONS
[facility]   Dr. Marshall Fernandez MD  [address]  [place] [address]  Phone: [phone]  fax [phone]    [facility]  Dr. Tre Cross  [phone] [address]  [address]  [place] [address]    [facility]  Dr. Tara Budinetz  [phone]  [address]  [address]  [place] [address]

## 2025-02-11 LAB
HPV HR 12 DNA CVX QL NAA+PROBE: NEGATIVE
HPV16 DNA CVX QL NAA+PROBE: NEGATIVE
HPV18 DNA CVX QL NAA+PROBE: NEGATIVE

## 2025-02-12 LAB
C TRACH DNA SPEC QL NAA+PROBE: NEGATIVE
N GONORRHOEA DNA SPEC QL NAA+PROBE: NEGATIVE

## 2025-02-13 ENCOUNTER — RESULTS FOLLOW-UP (OUTPATIENT)
Dept: OBGYN CLINIC | Facility: CLINIC | Age: 40
End: 2025-02-13

## 2025-02-13 LAB
LAB AP GYN PRIMARY INTERPRETATION: NORMAL
Lab: NORMAL

## 2025-02-24 ENCOUNTER — HOSPITAL ENCOUNTER (OUTPATIENT)
Dept: RADIOLOGY | Facility: CLINIC | Age: 40
Discharge: HOME | End: 2025-02-24
Payer: COMMERCIAL

## 2025-02-24 VITALS — WEIGHT: 205 LBS | HEIGHT: 68 IN | BODY MASS INDEX: 31.07 KG/M2

## 2025-02-24 DIAGNOSIS — Z12.31 VISIT FOR SCREENING MAMMOGRAM: ICD-10-CM

## 2025-02-24 DIAGNOSIS — Z80.3 FAMILY HISTORY OF BREAST CANCER: ICD-10-CM

## 2025-02-24 PROCEDURE — 77067 SCR MAMMO BI INCL CAD: CPT

## 2025-02-24 PROCEDURE — 77063 BREAST TOMOSYNTHESIS BI: CPT

## 2025-03-06 ENCOUNTER — TELEPHONE (OUTPATIENT)
Dept: OBGYN CLINIC | Facility: CLINIC | Age: 40
End: 2025-03-06

## 2025-03-06 ENCOUNTER — TELEPHONE (OUTPATIENT)
Dept: CARDIAC SURGERY | Facility: CLINIC | Age: 40
End: 2025-03-06

## 2025-03-06 DIAGNOSIS — Z80.3 FAMILY HISTORY OF BREAST CANCER: Primary | ICD-10-CM

## 2025-03-06 DIAGNOSIS — Z91.89 AT HIGH RISK FOR BREAST CANCER: ICD-10-CM

## 2025-03-06 NOTE — TELEPHONE ENCOUNTER
Chart reviewed due to referral placed to Surgical Oncology.  Pt had recent mammogram due to family history of breast cancer (sister-40)  Lifetime Tyrer-Cuzick: 22.52%

## 2025-03-06 NOTE — TELEPHONE ENCOUNTER
Spoke with patient reviewed that her mammogram is normal .   Her tyrer cuzik score is elevated:  RISK ASSESSMENT:   5 Year Tyrer-Cuzick: 1.24%  10 Year Tyrer-Cuzick: 3.06%  Lifetime Tyrer-Cuzick: 22.52%    Discussed that patient has the option for additional breast screening based on this score.  Discussed option for eval with breast surgery team for supplemental exams and possible breast MRI.        Patient's sisters genetic testing results are pending.  She will let me know if anything is + as would then recommend referral to genetics as well.

## 2025-03-14 ENCOUNTER — OFFICE VISIT (OUTPATIENT)
Age: 40
End: 2025-03-14
Payer: COMMERCIAL

## 2025-03-14 VITALS
BODY MASS INDEX: 31.28 KG/M2 | HEIGHT: 68 IN | WEIGHT: 206.4 LBS | SYSTOLIC BLOOD PRESSURE: 128 MMHG | OXYGEN SATURATION: 98 % | DIASTOLIC BLOOD PRESSURE: 86 MMHG | TEMPERATURE: 97.9 F | HEART RATE: 73 BPM

## 2025-03-14 DIAGNOSIS — R05.9 COUGH, UNSPECIFIED TYPE: Primary | ICD-10-CM

## 2025-03-14 DIAGNOSIS — R68.89 FLU-LIKE SYMPTOMS: ICD-10-CM

## 2025-03-14 PROCEDURE — 87636 SARSCOV2 & INF A&B AMP PRB: CPT

## 2025-03-14 PROCEDURE — 99213 OFFICE O/P EST LOW 20 MIN: CPT

## 2025-03-14 RX ORDER — BENZONATATE 200 MG/1
200 CAPSULE ORAL 3 TIMES DAILY PRN
Qty: 20 CAPSULE | Refills: 0 | Status: SHIPPED | OUTPATIENT
Start: 2025-03-14

## 2025-03-14 NOTE — LETTER
March 14, 2025     Patient: Maggie Bullock   YOB: 1985   Date of Visit: 3/14/2025       To Whom it May Concern:    Maggie Bullock was seen in my clinic on 3/14/2025. She may return to work on 3/17/25 .    If you have any questions or concerns, please don't hesitate to call.         Sincerely,          [facility]        CC: No Recipients

## 2025-03-14 NOTE — PATIENT INSTRUCTIONS
Thank you for trusting your health with [facility].    Please review the following instructions and return to our clinic or consider an Emergency Department visit for worsening or severe symptoms.      Instructions:   Utilize saline nasal spray OTC STRAIGHT down each nostril as often as needed  Utilize Flonase nasal spray with steroids OTC ANGLED towards your sinuses 2 times a day as needed  Maintain Tylenol every 6 hours as needed, do not exceed six, 500 mg doses in a 24 hour time period for fever, aches, and chills  Mucinex, blue box, consistently as written on the box can help break up mucus well; 2x a day for 3-5 days  Hydrate, hydrate, hydrate  If symptoms worsen or do not start resolving with the week, please contact PCP or consider another visit with our team

## 2025-03-14 NOTE — PROGRESS NOTES
"Name: Maggie Bullock      : 1985      MRN: [id number]  Encounter Provider: [facility]  Encounter Date: 3/14/2025   Encounter department: [facility]  :  Assessment & Plan  Cough, unspecified type    Orders:    Covid19 and INFLUENZA A/B PCR        Flu-like symptoms    Orders:    benzonatate (TESSALON) 200 MG capsule; Take 1 capsule (200 mg total) by mouth 3 (three) times a day as needed for cough    Concern for flu/covid today given initial aches, fever, URI symptoms. Covid/flu swab sent out, pt will review on portal. Reviewed return precautions. Tessalon pearls sent for cough. OTC management reviewed as below:  Instructions:   Utilize saline nasal spray OTC STRAIGHT down each nostril as often as needed  Utilize Flonase nasal spray with steroids OTC ANGLED towards your sinuses 2 times a day as needed  Maintain Tylenol every 6 hours as needed, do not exceed six, 500 mg doses in a 24 hour time period for fever, aches, and chills  Mucinex, blue box, consistently as written on the box can help break up mucus well  Hydrate, hydrate, hydrate  If symptoms worsen or do not start resolving with the week, please contact PCP or consider another visit with our team        History of Present Illness   HPI  Maggie Bullock is a 39 y.o. female who presents with cough, headache, and fatigue since Wednesday. Has taken OTC meds but symptoms have not improved           Review of Systems   Constitutional:  Positive for fever.   HENT:  Positive for congestion, postnasal drip and sinus pressure.    Respiratory:  Positive for cough.    Musculoskeletal:  Positive for arthralgias and myalgias.          Objective   /86   Pulse 73   Temp 97.9 °F (36.6 °C)   Ht 5' 7.5\" (1.715 m)   Wt 93.6 kg (206 lb 6.4 oz)   LMP 2025 (Approximate)   SpO2 98%   BMI 31.85 kg/m²      Physical Exam  Constitutional:       Appearance: She is ill-appearing.   HENT:      Head: Normocephalic and " atraumatic.      Right Ear: Ear canal normal.      Left Ear: Ear canal normal.      Ears:      Comments: B/l TM bulging     Nose: Congestion present.      Mouth/Throat:      Mouth: Mucous membranes are moist.   Cardiovascular:      Rate and Rhythm: Normal rate.   Pulmonary:      Effort: Pulmonary effort is normal.      Breath sounds: Normal breath sounds.   Skin:     General: Skin is warm and dry.   Neurological:      Mental Status: She is alert.

## 2025-03-15 LAB
FLUAV RNA RESP QL NAA+PROBE: POSITIVE
FLUBV RNA RESP QL NAA+PROBE: NEGATIVE
SARS-COV-2 RNA RESP QL NAA+PROBE: NEGATIVE

## 2025-03-17 ENCOUNTER — RESULTS FOLLOW-UP (OUTPATIENT)
Age: 40
End: 2025-03-17

## 2025-04-28 ENCOUNTER — TELEPHONE (OUTPATIENT)
Age: 40
End: 2025-04-28

## 2025-04-29 ENCOUNTER — OFFICE VISIT (OUTPATIENT)
Age: 40
End: 2025-04-29
Payer: COMMERCIAL

## 2025-04-29 VITALS
HEIGHT: 68 IN | SYSTOLIC BLOOD PRESSURE: 122 MMHG | HEART RATE: 62 BPM | BODY MASS INDEX: 31.37 KG/M2 | OXYGEN SATURATION: 99 % | WEIGHT: 207 LBS | DIASTOLIC BLOOD PRESSURE: 83 MMHG

## 2025-04-29 DIAGNOSIS — Z13.6 SCREENING FOR CARDIOVASCULAR CONDITION: ICD-10-CM

## 2025-04-29 DIAGNOSIS — L84 CALLUS OF HEEL: ICD-10-CM

## 2025-04-29 PROBLEM — E66.9 OBESITY (BMI 30-39.9): Status: RESOLVED | Noted: 2025-01-29 | Resolved: 2025-04-29

## 2025-04-29 PROCEDURE — 99213 OFFICE O/P EST LOW 20 MIN: CPT | Performed by: FAMILY MEDICINE

## 2025-04-29 NOTE — PROGRESS NOTES
"Name: Maggie Bullock      : 1985      MRN: [id number]  Encounter Provider: JOYCELYN Jenkins  Encounter Date: 2025   Encounter department: [facility]  :  Assessment & Plan  BMI 31.0-31.9,adult  Patient educated on healthy, balanced diet and exercise 30 minutes a day recommended         Callus of heel    Orders:    salicylic acid-lactic acid 17 %; Apply topically daily    Screening for cardiovascular condition    Orders:    Lipid panel; Future    Comprehensive metabolic panel; Future    CBC and differential; Future    TSH, 3rd generation; Future           History of Present Illness   Patient here for callus to left foot for about 5 years. Patient feels it rubs on her shoes and it gets worse with certain shoes. Patient has tried filling it down but it always comes back.       Review of Systems   Constitutional:  Negative for chills and fever.   HENT:  Negative for ear pain and sore throat.    Eyes:  Negative for pain and visual disturbance.   Respiratory:  Negative for cough and shortness of breath.    Cardiovascular:  Negative for chest pain and palpitations.   Gastrointestinal:  Negative for abdominal pain and vomiting.   Genitourinary:  Negative for dysuria and hematuria.   Musculoskeletal:  Negative for arthralgias and back pain.   Skin:  Negative for color change and rash.        Callus to left foot   Neurological:  Negative for seizures and syncope.   All other systems reviewed and are negative.      Objective   /83 (BP Location: Left arm, Patient Position: Sitting, Cuff Size: Standard)   Pulse 62   Ht 5' 7.5\" (1.715 m)   Wt 93.9 kg (207 lb)   SpO2 99%   BMI 31.94 kg/m²      Physical Exam  Vitals and nursing note reviewed.   Constitutional:       General: She is not in acute distress.     Appearance: She is well-developed.   HENT:      Head: Normocephalic and atraumatic.   Eyes:      Conjunctiva/sclera: Conjunctivae normal.   Cardiovascular:      Rate and " Rhythm: Normal rate and regular rhythm.      Heart sounds: No murmur heard.  Pulmonary:      Effort: Pulmonary effort is normal. No respiratory distress.      Breath sounds: Normal breath sounds.   Abdominal:      Palpations: Abdomen is soft.      Tenderness: There is no abdominal tenderness.   Musculoskeletal:         General: No swelling.      Cervical back: Neck supple.   Skin:     General: Skin is warm and dry.      Capillary Refill: Capillary refill takes less than 2 seconds.      Comments: Callus noted to left lateral heel   Neurological:      Mental Status: She is alert.   Psychiatric:         Mood and Affect: Mood normal.

## 2025-07-21 ENCOUNTER — OFFICE VISIT (OUTPATIENT)
Age: 40
End: 2025-07-21
Payer: COMMERCIAL

## 2025-07-21 VITALS
WEIGHT: 206.5 LBS | HEART RATE: 56 BPM | DIASTOLIC BLOOD PRESSURE: 80 MMHG | BODY MASS INDEX: 31.3 KG/M2 | HEIGHT: 68 IN | SYSTOLIC BLOOD PRESSURE: 138 MMHG | OXYGEN SATURATION: 97 %

## 2025-07-21 DIAGNOSIS — F32.A ANXIETY AND DEPRESSION: ICD-10-CM

## 2025-07-21 DIAGNOSIS — Z80.8 FAMILY HX OF MELANOMA: ICD-10-CM

## 2025-07-21 DIAGNOSIS — F41.9 ANXIETY AND DEPRESSION: ICD-10-CM

## 2025-07-21 PROCEDURE — 99213 OFFICE O/P EST LOW 20 MIN: CPT | Performed by: FAMILY MEDICINE

## 2025-07-21 NOTE — PROGRESS NOTES
"Name: Maggie Bullock      : 1985      MRN: [id number]  Encounter Provider: JOYCELYN Jenkins  Encounter Date: 2025   Encounter department: [facility]  :  Assessment & Plan  BMI 31.0-31.9,adult  Patient educated on healthy, balanced diet and exercise 30 minutes a day recommended               Anxiety and depression      Orders:    Ambulatory referral to Psych Services; Future    Family hx of melanoma    Orders:    Ambulatory Referral to Dermatology; Future           History of Present Illness   Patient here for a follow up. Patient would like to talk to a therapist. Patient reports she is pretty good about taking care of her mental health but would like to speak to a therapist. Patient denies hx of suicidal/homicidal ideations. She is just dealing with a lot in her family.       Review of Systems   Constitutional:  Negative for chills and fever.   HENT:  Negative for ear pain and sore throat.    Eyes:  Negative for pain and visual disturbance.   Respiratory:  Negative for cough and shortness of breath.    Cardiovascular:  Negative for chest pain and palpitations.   Gastrointestinal:  Negative for abdominal pain and vomiting.   Genitourinary:  Negative for dysuria and hematuria.   Musculoskeletal:  Negative for arthralgias and back pain.   Skin:  Negative for color change and rash.   Neurological:  Negative for seizures and syncope.   All other systems reviewed and are negative.      Objective   /80 (BP Location: Left arm, Patient Position: Sitting, Cuff Size: Standard)   Pulse 56   Ht 5' 7.5\" (1.715 m)   Wt 93.7 kg (206 lb 8 oz)   SpO2 (!) 88%   BMI 31.87 kg/m²      Physical Exam  Vitals and nursing note reviewed.   Constitutional:       General: She is not in acute distress.     Appearance: She is well-developed.   HENT:      Head: Normocephalic and atraumatic.     Eyes:      Conjunctiva/sclera: Conjunctivae normal.       Cardiovascular:      Rate and Rhythm: Normal " rate and regular rhythm.      Heart sounds: No murmur heard.  Pulmonary:      Effort: Pulmonary effort is normal. No respiratory distress.      Breath sounds: Normal breath sounds.   Abdominal:      Palpations: Abdomen is soft.      Tenderness: There is no abdominal tenderness.     Musculoskeletal:         General: No swelling.      Cervical back: Neck supple.     Skin:     General: Skin is warm and dry.      Capillary Refill: Capillary refill takes less than 2 seconds.     Neurological:      Mental Status: She is alert.     Psychiatric:         Mood and Affect: Mood normal.

## 2025-08-21 ENCOUNTER — ULTRASOUND (OUTPATIENT)
Age: 40
End: 2025-08-21

## 2025-08-21 VITALS
HEIGHT: 68 IN | BODY MASS INDEX: 30.28 KG/M2 | SYSTOLIC BLOOD PRESSURE: 132 MMHG | WEIGHT: 199.8 LBS | DIASTOLIC BLOOD PRESSURE: 84 MMHG

## 2025-08-21 DIAGNOSIS — Z34.90 EARLY STAGE OF PREGNANCY: ICD-10-CM

## 2025-08-21 DIAGNOSIS — Z32.01 PREGNANCY EXAMINATION OR TEST, POSITIVE RESULT: Primary | ICD-10-CM

## 2025-08-21 LAB — SL AMB POCT URINE HCG: POSITIVE
